# Patient Record
Sex: MALE | Race: BLACK OR AFRICAN AMERICAN | NOT HISPANIC OR LATINO | URBAN - METROPOLITAN AREA
[De-identification: names, ages, dates, MRNs, and addresses within clinical notes are randomized per-mention and may not be internally consistent; named-entity substitution may affect disease eponyms.]

---

## 2023-08-11 ENCOUNTER — HOSPITAL ENCOUNTER (OUTPATIENT)
Facility: HOSPITAL | Age: 68
Discharge: HOSPICE | End: 2023-08-14
Attending: STUDENT IN AN ORGANIZED HEALTH CARE EDUCATION/TRAINING PROGRAM | Admitting: EMERGENCY MEDICINE
Payer: MEDICARE

## 2023-08-11 DIAGNOSIS — N39.0 URINARY TRACT INFECTION WITHOUT HEMATURIA, SITE UNSPECIFIED: Primary | ICD-10-CM

## 2023-08-11 DIAGNOSIS — R07.9 CHEST PAIN: ICD-10-CM

## 2023-08-11 DIAGNOSIS — R41.82 AMS (ALTERED MENTAL STATUS): ICD-10-CM

## 2023-08-11 LAB
ALBUMIN SERPL BCP-MCNC: 2.3 G/DL (ref 3.5–5.2)
ALP SERPL-CCNC: 170 U/L (ref 55–135)
ALT SERPL W/O P-5'-P-CCNC: 10 U/L (ref 10–44)
ANION GAP SERPL CALC-SCNC: 10 MMOL/L (ref 8–16)
AST SERPL-CCNC: 58 U/L (ref 10–40)
BASOPHILS # BLD AUTO: 0.05 K/UL (ref 0–0.2)
BASOPHILS NFR BLD: 0.5 % (ref 0–1.9)
BILIRUB SERPL-MCNC: 0.9 MG/DL (ref 0.1–1)
BUN SERPL-MCNC: 10 MG/DL (ref 8–23)
CALCIUM SERPL-MCNC: 9.8 MG/DL (ref 8.7–10.5)
CHLORIDE SERPL-SCNC: 104 MMOL/L (ref 95–110)
CO2 SERPL-SCNC: 30 MMOL/L (ref 23–29)
CREAT SERPL-MCNC: 0.6 MG/DL (ref 0.5–1.4)
DIFFERENTIAL METHOD: ABNORMAL
EOSINOPHIL # BLD AUTO: 0.1 K/UL (ref 0–0.5)
EOSINOPHIL NFR BLD: 0.7 % (ref 0–8)
ERYTHROCYTE [DISTWIDTH] IN BLOOD BY AUTOMATED COUNT: 18 % (ref 11.5–14.5)
EST. GFR  (NO RACE VARIABLE): >60 ML/MIN/1.73 M^2
GLUCOSE SERPL-MCNC: 98 MG/DL (ref 70–110)
HCT VFR BLD AUTO: 31.8 % (ref 40–54)
HGB BLD-MCNC: 9.7 G/DL (ref 14–18)
IMM GRANULOCYTES # BLD AUTO: 0.03 K/UL (ref 0–0.04)
IMM GRANULOCYTES NFR BLD AUTO: 0.3 % (ref 0–0.5)
LYMPHOCYTES # BLD AUTO: 0.9 K/UL (ref 1–4.8)
LYMPHOCYTES NFR BLD: 8.9 % (ref 18–48)
MCH RBC QN AUTO: 26.6 PG (ref 27–31)
MCHC RBC AUTO-ENTMCNC: 30.5 G/DL (ref 32–36)
MCV RBC AUTO: 87 FL (ref 82–98)
MONOCYTES # BLD AUTO: 0.5 K/UL (ref 0.3–1)
MONOCYTES NFR BLD: 5.1 % (ref 4–15)
NEUTROPHILS # BLD AUTO: 8.5 K/UL (ref 1.8–7.7)
NEUTROPHILS NFR BLD: 84.5 % (ref 38–73)
NRBC BLD-RTO: 0 /100 WBC
PLATELET # BLD AUTO: 236 K/UL (ref 150–450)
PMV BLD AUTO: 10.7 FL (ref 9.2–12.9)
POTASSIUM SERPL-SCNC: 2.9 MMOL/L (ref 3.5–5.1)
PROT SERPL-MCNC: 7.8 G/DL (ref 6–8.4)
RBC # BLD AUTO: 3.65 M/UL (ref 4.6–6.2)
SARS-COV-2 RDRP RESP QL NAA+PROBE: NEGATIVE
SODIUM SERPL-SCNC: 144 MMOL/L (ref 136–145)
WBC # BLD AUTO: 10.04 K/UL (ref 3.9–12.7)

## 2023-08-11 PROCEDURE — 25000003 PHARM REV CODE 250: Performed by: STUDENT IN AN ORGANIZED HEALTH CARE EDUCATION/TRAINING PROGRAM

## 2023-08-11 PROCEDURE — 85025 COMPLETE CBC W/AUTO DIFF WBC: CPT

## 2023-08-11 PROCEDURE — 99285 EMERGENCY DEPT VISIT HI MDM: CPT | Mod: 25

## 2023-08-11 PROCEDURE — 96361 HYDRATE IV INFUSION ADD-ON: CPT

## 2023-08-11 PROCEDURE — 80053 COMPREHEN METABOLIC PANEL: CPT

## 2023-08-11 PROCEDURE — 25000003 PHARM REV CODE 250

## 2023-08-11 PROCEDURE — U0002 COVID-19 LAB TEST NON-CDC: HCPCS

## 2023-08-11 RX ADMIN — SODIUM CHLORIDE 500 ML: 9 INJECTION, SOLUTION INTRAVENOUS at 10:08

## 2023-08-11 NOTE — Clinical Note
Diagnosis: Urinary tract infection without hematuria, site unspecified [[id number]]   Future Attending Provider: MARCIE BARRAZA [[id number]]   Admitting Provider:: LOUIE GARCIA [[id number]]

## 2023-08-12 PROBLEM — E78.5 HLD (HYPERLIPIDEMIA): Status: ACTIVE | Noted: 2023-08-12

## 2023-08-12 PROBLEM — I10 HTN (HYPERTENSION): Status: ACTIVE | Noted: 2023-08-12

## 2023-08-12 PROBLEM — E87.6 HYPOKALEMIA: Status: ACTIVE | Noted: 2023-08-12

## 2023-08-12 PROBLEM — F03.918 DEMENTIA WITH BEHAVIORAL DISTURBANCE: Status: ACTIVE | Noted: 2023-08-12

## 2023-08-12 PROBLEM — G93.40 ACUTE ENCEPHALOPATHY: Status: RESOLVED | Noted: 2023-08-12 | Resolved: 2023-08-12

## 2023-08-12 PROBLEM — N39.0 URINARY TRACT INFECTION WITHOUT HEMATURIA: Status: ACTIVE | Noted: 2023-08-12

## 2023-08-12 PROBLEM — G93.40 ACUTE ENCEPHALOPATHY: Status: ACTIVE | Noted: 2023-08-12

## 2023-08-12 LAB
ALBUMIN SERPL BCP-MCNC: 2.2 G/DL (ref 3.5–5.2)
ALP SERPL-CCNC: 175 U/L (ref 55–135)
ALT SERPL W/O P-5'-P-CCNC: 8 U/L (ref 10–44)
AMMONIA PLAS-SCNC: 44 UMOL/L (ref 10–50)
AMMONIA PLAS-SCNC: 61 UMOL/L (ref 10–50)
ANION GAP SERPL CALC-SCNC: 14 MMOL/L (ref 8–16)
AST SERPL-CCNC: 55 U/L (ref 10–40)
BACTERIA #/AREA URNS AUTO: ABNORMAL /HPF
BASOPHILS # BLD AUTO: 0.06 K/UL (ref 0–0.2)
BASOPHILS NFR BLD: 0.5 % (ref 0–1.9)
BILIRUB SERPL-MCNC: 0.8 MG/DL (ref 0.1–1)
BILIRUB UR QL STRIP: NEGATIVE
BUN SERPL-MCNC: 9 MG/DL (ref 8–23)
CALCIUM SERPL-MCNC: 9.3 MG/DL (ref 8.7–10.5)
CHLORIDE SERPL-SCNC: 104 MMOL/L (ref 95–110)
CLARITY UR REFRACT.AUTO: ABNORMAL
CO2 SERPL-SCNC: 27 MMOL/L (ref 23–29)
COLOR UR AUTO: ABNORMAL
CREAT SERPL-MCNC: 0.6 MG/DL (ref 0.5–1.4)
DIFFERENTIAL METHOD: ABNORMAL
EOSINOPHIL # BLD AUTO: 0.1 K/UL (ref 0–0.5)
EOSINOPHIL NFR BLD: 0.5 % (ref 0–8)
ERYTHROCYTE [DISTWIDTH] IN BLOOD BY AUTOMATED COUNT: 18.5 % (ref 11.5–14.5)
EST. GFR  (NO RACE VARIABLE): >60 ML/MIN/1.73 M^2
GLUCOSE SERPL-MCNC: 100 MG/DL (ref 70–110)
GLUCOSE UR QL STRIP: NEGATIVE
HCT VFR BLD AUTO: 29.9 % (ref 40–54)
HGB BLD-MCNC: 8.9 G/DL (ref 14–18)
HGB UR QL STRIP: ABNORMAL
HYALINE CASTS UR QL AUTO: 4 /LPF
IMM GRANULOCYTES # BLD AUTO: 0.03 K/UL (ref 0–0.04)
IMM GRANULOCYTES NFR BLD AUTO: 0.3 % (ref 0–0.5)
INR PPP: 1.3 (ref 0.8–1.2)
KETONES UR QL STRIP: ABNORMAL
LEUKOCYTE ESTERASE UR QL STRIP: ABNORMAL
LYMPHOCYTES # BLD AUTO: 0.8 K/UL (ref 1–4.8)
LYMPHOCYTES NFR BLD: 7 % (ref 18–48)
MAGNESIUM SERPL-MCNC: 1.5 MG/DL (ref 1.6–2.6)
MCH RBC QN AUTO: 26.3 PG (ref 27–31)
MCHC RBC AUTO-ENTMCNC: 29.8 G/DL (ref 32–36)
MCV RBC AUTO: 88 FL (ref 82–98)
MICROSCOPIC COMMENT: ABNORMAL
MONOCYTES # BLD AUTO: 0.6 K/UL (ref 0.3–1)
MONOCYTES NFR BLD: 5.1 % (ref 4–15)
NEUTROPHILS # BLD AUTO: 9.7 K/UL (ref 1.8–7.7)
NEUTROPHILS NFR BLD: 86.6 % (ref 38–73)
NITRITE UR QL STRIP: NEGATIVE
NRBC BLD-RTO: 0 /100 WBC
PH UR STRIP: 6 [PH] (ref 5–8)
PHOSPHATE SERPL-MCNC: 2.3 MG/DL (ref 2.7–4.5)
PLATELET # BLD AUTO: 202 K/UL (ref 150–450)
PMV BLD AUTO: 11 FL (ref 9.2–12.9)
POTASSIUM SERPL-SCNC: 3.1 MMOL/L (ref 3.5–5.1)
PROT SERPL-MCNC: 7.5 G/DL (ref 6–8.4)
PROT UR QL STRIP: ABNORMAL
PROTHROMBIN TIME: 13.3 SEC (ref 9–12.5)
RBC # BLD AUTO: 3.39 M/UL (ref 4.6–6.2)
RBC #/AREA URNS AUTO: >100 /HPF (ref 0–4)
SODIUM SERPL-SCNC: 145 MMOL/L (ref 136–145)
SP GR UR STRIP: 1.02 (ref 1–1.03)
SQUAMOUS #/AREA URNS AUTO: 5 /HPF
URN SPEC COLLECT METH UR: ABNORMAL
WBC # BLD AUTO: 11.15 K/UL (ref 3.9–12.7)
WBC #/AREA URNS AUTO: >100 /HPF (ref 0–5)

## 2023-08-12 PROCEDURE — G0378 HOSPITAL OBSERVATION PER HR: HCPCS

## 2023-08-12 PROCEDURE — 25000003 PHARM REV CODE 250: Performed by: EMERGENCY MEDICINE

## 2023-08-12 PROCEDURE — 83735 ASSAY OF MAGNESIUM: CPT

## 2023-08-12 PROCEDURE — 87088 URINE BACTERIA CULTURE: CPT

## 2023-08-12 PROCEDURE — 87086 URINE CULTURE/COLONY COUNT: CPT

## 2023-08-12 PROCEDURE — 96365 THER/PROPH/DIAG IV INF INIT: CPT

## 2023-08-12 PROCEDURE — 96376 TX/PRO/DX INJ SAME DRUG ADON: CPT

## 2023-08-12 PROCEDURE — 25000003 PHARM REV CODE 250

## 2023-08-12 PROCEDURE — 87106 FUNGI IDENTIFICATION YEAST: CPT

## 2023-08-12 PROCEDURE — 99223 1ST HOSP IP/OBS HIGH 75: CPT | Mod: ,,, | Performed by: INTERNAL MEDICINE

## 2023-08-12 PROCEDURE — 63600175 PHARM REV CODE 636 W HCPCS

## 2023-08-12 PROCEDURE — 85025 COMPLETE CBC W/AUTO DIFF WBC: CPT

## 2023-08-12 PROCEDURE — 80053 COMPREHEN METABOLIC PANEL: CPT

## 2023-08-12 PROCEDURE — 96375 TX/PRO/DX INJ NEW DRUG ADDON: CPT

## 2023-08-12 PROCEDURE — 63600175 PHARM REV CODE 636 W HCPCS: Performed by: EMERGENCY MEDICINE

## 2023-08-12 PROCEDURE — 82140 ASSAY OF AMMONIA: CPT | Mod: 91

## 2023-08-12 PROCEDURE — 81001 URINALYSIS AUTO W/SCOPE: CPT

## 2023-08-12 PROCEDURE — 36415 COLL VENOUS BLD VENIPUNCTURE: CPT

## 2023-08-12 PROCEDURE — 85610 PROTHROMBIN TIME: CPT

## 2023-08-12 PROCEDURE — 84100 ASSAY OF PHOSPHORUS: CPT

## 2023-08-12 PROCEDURE — 99223 PR INITIAL HOSPITAL CARE,LEVL III: ICD-10-PCS | Mod: ,,, | Performed by: INTERNAL MEDICINE

## 2023-08-12 RX ORDER — TAMSULOSIN HYDROCHLORIDE 0.4 MG/1
0.4 CAPSULE ORAL DAILY
Status: DISCONTINUED | OUTPATIENT
Start: 2023-08-12 | End: 2023-08-14 | Stop reason: HOSPADM

## 2023-08-12 RX ORDER — ACETAMINOPHEN 325 MG/1
650 TABLET ORAL EVERY 4 HOURS PRN
Status: DISCONTINUED | OUTPATIENT
Start: 2023-08-12 | End: 2023-08-14 | Stop reason: HOSPADM

## 2023-08-12 RX ORDER — SODIUM CHLORIDE 0.9 % (FLUSH) 0.9 %
10 SYRINGE (ML) INJECTION EVERY 12 HOURS PRN
Status: DISCONTINUED | OUTPATIENT
Start: 2023-08-12 | End: 2023-08-14 | Stop reason: HOSPADM

## 2023-08-12 RX ORDER — NALOXONE HCL 0.4 MG/ML
0.02 VIAL (ML) INJECTION
Status: DISCONTINUED | OUTPATIENT
Start: 2023-08-12 | End: 2023-08-14 | Stop reason: HOSPADM

## 2023-08-12 RX ORDER — HALOPERIDOL 5 MG/ML
2 INJECTION INTRAMUSCULAR ONCE
Status: COMPLETED | OUTPATIENT
Start: 2023-08-12 | End: 2023-08-12

## 2023-08-12 RX ORDER — NAPROXEN SODIUM 220 MG/1
81 TABLET, FILM COATED ORAL DAILY
Status: DISCONTINUED | OUTPATIENT
Start: 2023-08-12 | End: 2023-08-14 | Stop reason: HOSPADM

## 2023-08-12 RX ORDER — HALOPERIDOL 5 MG/ML
2.5 INJECTION INTRAMUSCULAR ONCE AS NEEDED
Status: DISCONTINUED | OUTPATIENT
Start: 2023-08-12 | End: 2023-08-14 | Stop reason: HOSPADM

## 2023-08-12 RX ORDER — MAGNESIUM SULFATE HEPTAHYDRATE 40 MG/ML
2 INJECTION, SOLUTION INTRAVENOUS ONCE
Status: DISCONTINUED | OUTPATIENT
Start: 2023-08-12 | End: 2023-08-13

## 2023-08-12 RX ORDER — GLUCAGON 1 MG
1 KIT INJECTION
Status: DISCONTINUED | OUTPATIENT
Start: 2023-08-12 | End: 2023-08-14 | Stop reason: HOSPADM

## 2023-08-12 RX ORDER — PANTOPRAZOLE SODIUM 40 MG/1
40 TABLET, DELAYED RELEASE ORAL DAILY
Status: DISCONTINUED | OUTPATIENT
Start: 2023-08-12 | End: 2023-08-14 | Stop reason: HOSPADM

## 2023-08-12 RX ORDER — POTASSIUM CHLORIDE 20 MEQ/1
40 TABLET, EXTENDED RELEASE ORAL ONCE
Status: COMPLETED | OUTPATIENT
Start: 2023-08-12 | End: 2023-08-12

## 2023-08-12 RX ORDER — ATORVASTATIN CALCIUM 40 MG/1
40 TABLET, FILM COATED ORAL DAILY
Status: DISCONTINUED | OUTPATIENT
Start: 2023-08-12 | End: 2023-08-14 | Stop reason: HOSPADM

## 2023-08-12 RX ORDER — CARVEDILOL 25 MG/1
25 TABLET ORAL 2 TIMES DAILY
Status: DISCONTINUED | OUTPATIENT
Start: 2023-08-12 | End: 2023-08-14 | Stop reason: HOSPADM

## 2023-08-12 RX ORDER — IBUPROFEN 200 MG
24 TABLET ORAL
Status: DISCONTINUED | OUTPATIENT
Start: 2023-08-12 | End: 2023-08-14 | Stop reason: HOSPADM

## 2023-08-12 RX ORDER — ENOXAPARIN SODIUM 100 MG/ML
30 INJECTION SUBCUTANEOUS EVERY 24 HOURS
Status: DISCONTINUED | OUTPATIENT
Start: 2023-08-12 | End: 2023-08-12

## 2023-08-12 RX ORDER — AMLODIPINE BESYLATE 10 MG/1
10 TABLET ORAL DAILY
Status: DISCONTINUED | OUTPATIENT
Start: 2023-08-12 | End: 2023-08-14 | Stop reason: HOSPADM

## 2023-08-12 RX ORDER — ENOXAPARIN SODIUM 100 MG/ML
40 INJECTION SUBCUTANEOUS EVERY 24 HOURS
Status: DISCONTINUED | OUTPATIENT
Start: 2023-08-12 | End: 2023-08-14 | Stop reason: HOSPADM

## 2023-08-12 RX ORDER — IBUPROFEN 200 MG
16 TABLET ORAL
Status: DISCONTINUED | OUTPATIENT
Start: 2023-08-12 | End: 2023-08-14 | Stop reason: HOSPADM

## 2023-08-12 RX ORDER — LACTULOSE 10 G/15ML
20 SOLUTION ORAL 3 TIMES DAILY
Status: DISCONTINUED | OUTPATIENT
Start: 2023-08-12 | End: 2023-08-14 | Stop reason: HOSPADM

## 2023-08-12 RX ORDER — HALOPERIDOL 5 MG/ML
2 INJECTION INTRAMUSCULAR
Status: COMPLETED | OUTPATIENT
Start: 2023-08-12 | End: 2023-08-12

## 2023-08-12 RX ORDER — LISINOPRIL 20 MG/1
40 TABLET ORAL DAILY
Status: DISCONTINUED | OUTPATIENT
Start: 2023-08-12 | End: 2023-08-14 | Stop reason: HOSPADM

## 2023-08-12 RX ADMIN — CARVEDILOL 25 MG: 25 TABLET, FILM COATED ORAL at 08:08

## 2023-08-12 RX ADMIN — TAMSULOSIN HYDROCHLORIDE 0.4 MG: 0.4 CAPSULE ORAL at 08:08

## 2023-08-12 RX ADMIN — PANTOPRAZOLE SODIUM 40 MG: 40 TABLET, DELAYED RELEASE ORAL at 08:08

## 2023-08-12 RX ADMIN — AMLODIPINE BESYLATE 10 MG: 10 TABLET ORAL at 08:08

## 2023-08-12 RX ADMIN — ASPIRIN 81 MG 81 MG: 81 TABLET ORAL at 08:08

## 2023-08-12 RX ADMIN — HALOPERIDOL LACTATE 2 MG: 5 INJECTION, SOLUTION INTRAMUSCULAR at 03:08

## 2023-08-12 RX ADMIN — HALOPERIDOL LACTATE 2 MG: 5 INJECTION, SOLUTION INTRAMUSCULAR at 06:08

## 2023-08-12 RX ADMIN — ATORVASTATIN CALCIUM 40 MG: 40 TABLET, FILM COATED ORAL at 08:08

## 2023-08-12 RX ADMIN — POTASSIUM CHLORIDE 40 MEQ: 1500 TABLET, EXTENDED RELEASE ORAL at 08:08

## 2023-08-12 RX ADMIN — CEFTRIAXONE 1 G: 1 INJECTION, POWDER, FOR SOLUTION INTRAMUSCULAR; INTRAVENOUS at 03:08

## 2023-08-12 RX ADMIN — LISINOPRIL 40 MG: 20 TABLET ORAL at 08:08

## 2023-08-12 NOTE — ED NOTES
Pt care assumed. Report received by MASTER Kathleen. Pt lying in stretcher in low and locked position and side rails raised x2. Call light, pt's belongings, and bedside table within pt's reach. Pt on continuous cardiac monitoring, pulse oximetry, and BP cycling every 30 minutes.

## 2023-08-12 NOTE — HPI
68-year-old male with of dementia, reported stage IV liver cancer, HLD, HTN  is presenting from [facility] after having aggressive behavior. Pt has laceration of the left foot, there is dried blood there but no injury or laceration.Patient condition has been declining from the past year after cancer diagnosis. Patient was found to be altered at his nursing home, threw a chair, and broke a window. Patients niece says this is abnormal for the patient and he normally is bed bound, slurs his words but does know his name and his  at baseline. He has an indwelling catheter that was pulled out when he was agitated, but was replaced. Patient denies fever, chills, nausea, vomiting, SOB, CP, dysuria, abdominal pain, leg swelling.     History is limited secondary to the patient's clinical condition. States he does not want to be here and want to go home with niece. Said he does not want to go back to [facility].       Afebrile, -130s, WBC 10, K 2.9, Cr 0.6, AlkP 170,   U/A >100 WBCs and RBCs, +3 leukocytes, culture pending, CTH clear  CXR - Suboptimal inspiration with mild bilateral atelectasis  Given CTX, KHCO3, 500 mL NS, haldol in the ED

## 2023-08-12 NOTE — NURSING
[facility] dept called to inform about [facility] orders. Spoke w/ Malinda and she said she will try to assign pt to a watcher.

## 2023-08-12 NOTE — PROGRESS NOTES
Pharmacist Renal Dose Adjustment Note    Ozzie Nobles is a 68 y.o. male being treated with the medication enoxaparin    Patient Data:    Vital Signs (Most Recent):  Temp: 97.7 °F (36.5 °C) (08/12/23 0755)  Pulse: 96 (08/12/23 0755)  Resp: 18 (08/12/23 0755)  BP: (!) 142/87 (08/12/23 0755)  SpO2: 96 % (08/12/23 0755) Vital Signs (72h Range):  Temp:  [97 °F (36.1 °C)-98.9 °F (37.2 °C)]   Pulse:  []   Resp:  [16-25]   BP: (106-154)/(72-88)   SpO2:  [92 %-96 %]      Recent Labs   Lab 08/11/23 2158 08/12/23  0343   CREATININE 0.6 0.6     Serum creatinine: 0.6 mg/dL 08/12/23 0343  Estimated creatinine clearance: 95.9 mL/min    Medication:Enoxaparin dose: 30 mg frequency daily will be changed to medication:enoxaparin dose:40 mg frequency:daily    Pharmacist's Name: Radha Gonzales  Pharmacist's Extension: [phone]

## 2023-08-12 NOTE — H&P
[facility] - Intensive Care ([address] Medicine  History & Physical    Patient Name: Ozzie Nobles  MRN: [id number]  Patient Class: OP- Observation  Admission Date: 2023  Attending Physician: Denver Mccoy, *   Primary Care Provider: No primary care provider on file.         Patient information was obtained from patient, past medical records and ER records.     Subjective:     Chief Complaint:   Chief Complaint   Patient presents with    Aggressive Behavior     Patient with history of behavior. Patient from [facility], was having aggressive behavior with staff earlier but they were able to calm him down. Staff thinks that the patient then threw chair at window. Has laceration to his left foot, bleeding controlled.        HPI: 68-year-old male with of dementia, reported stage IV liver cancer, HLD, HTN  is presenting from [facility] after having aggressive behavior. Pt has laceration of the left foot, there is dried blood there but no injury or laceration.Patient condition has been declining from the past year after cancer diagnosis. Patient was found to be altered at his nursing home, threw a chair, and broke a window. Patients niece says this is abnormal for the patient and he normally is bed bound, slurs his words but does know his name and his  at baseline. He has an indwelling catheter that was pulled out when he was agitated, but was replaced. Patient denies fever, chills, nausea, vomiting, SOB, CP, dysuria, abdominal pain, leg swelling.     History is limited secondary to the patient's clinical condition. States he does not want to be here and want to go home with niece. Said he does not want to go back to [facility].       Afebrile, -130s, WBC 10, K 2.9, Cr 0.6, AlkP 170,   U/A >100 WBCs and RBCs, +3 leukocytes, culture pending, CTH clear  CXR - Suboptimal inspiration with mild bilateral atelectasis  Given CTX, KHCO3, 500 mL NS, haldol in the  ED             No past medical history on file.    No past surgical history on file.    Review of patient's allergies indicates:  Not on File    No current facility-administered medications on file prior to encounter.     No current outpatient medications on file prior to encounter.     Family History    None       Tobacco Use    Smoking status: Not on file    Smokeless tobacco: Not on file   Substance and Sexual Activity    Alcohol use: Not on file    Drug use: Not on file    Sexual activity: Not on file     Review of Systems   Constitutional:  Negative for chills, fatigue and fever.   HENT: Negative.     Eyes:  Negative for pain and redness.   Respiratory:  Negative for apnea, choking, shortness of breath and stridor.    Cardiovascular:  Negative for chest pain and leg swelling.   Gastrointestinal:  Negative for abdominal distention, abdominal pain and blood in stool.   Endocrine: Negative.    Genitourinary:  Negative for dysuria, flank pain and genital sores.   Neurological: Negative.    Psychiatric/Behavioral:  Positive for agitation and behavioral problems.      Objective:     Vital Signs (Most Recent):  Temp: 98.8 °F (37.1 °C) (08/11/23 2034)  Pulse: 110 (08/12/23 0000)  Resp: (!) 25 (08/12/23 0000)  BP: 129/88 (08/12/23 0000)  SpO2: 95 % (08/12/23 0000) Vital Signs (24h Range):  Temp:  [98.4 °F (36.9 °C)-98.8 °F (37.1 °C)] 98.8 °F (37.1 °C)  Pulse:  [100-110] 110  Resp:  [16-25] 25  SpO2:  [92 %-95 %] 95 %  BP: (106-136)/(72-88) 129/88        There is no height or weight on file to calculate BMI.     Physical Exam  Constitutional:       General: He is not in acute distress.     Appearance: Normal appearance. He is not toxic-appearing.   HENT:      Head: Normocephalic.      Mouth/Throat:      Mouth: Mucous membranes are moist.      Pharynx: Oropharynx is clear.   Eyes:      Extraocular Movements: Extraocular movements intact.   Cardiovascular:      Rate and Rhythm: Normal rate and regular rhythm.      Pulses:  "Normal pulses.      Heart sounds: Normal heart sounds. No murmur heard.  Pulmonary:      Effort: No respiratory distress.      Breath sounds: Normal breath sounds. No wheezing.   Abdominal:      General: There is distension.      Tenderness: There is no abdominal tenderness.   Musculoskeletal:         General: Normal range of motion.   Skin:     General: Skin is warm and dry.      Capillary Refill: Capillary refill takes less than 2 seconds.   Neurological:      Mental Status: He is alert. He is disoriented.      Comments: Alert to name and year                Significant Labs: All pertinent labs within the past 24 hours have been reviewed.  CBC:   Recent Labs   Lab 08/11/23 2158   WBC 10.04   HGB 9.7*   HCT 31.8*        CMP:   Recent Labs   Lab 08/11/23 2158      K 2.9*      CO2 30*   GLU 98   BUN 10   CREATININE 0.6   CALCIUM 9.8   PROT 7.8   ALBUMIN 2.3*   BILITOT 0.9   ALKPHOS 170*   AST 58*   ALT 10   ANIONGAP 10     Urine Culture: No results for input(s): "LABURIN" in the last 48 hours.  Urine Studies:   Recent Labs   Lab 08/12/23  0022   COLORU Red*   APPEARANCEUA Cloudy*   PHUR 6.0   SPECGRAV 1.020   PROTEINUA 2+*   GLUCUA Negative   KETONESU 1+*   BILIRUBINUA Negative   OCCULTUA 3+*   NITRITE Negative   LEUKOCYTESUR 3+*   RBCUA >100*   WBCUA >100*   BACTERIA Many*   SQUAMEPITHEL 5   HYALINECASTS 4*       Significant Imaging: I have reviewed all pertinent imaging results/findings within the past 24 hours.  I have reviewed and interpreted all pertinent imaging results/findings within the past 24 hours.  Imaging Results              X-Ray Chest AP Portable (In process)  Result time 08/12/23 03:17:27                     CT Head Without Contrast (Final result)  Result time 08/12/23 01:16:12      Final result by Memo Torres MD (08/12/23 01:16:12)                   Impression:      No acute intracranial abnormality.    Electronically signed by resident: Tommy " Alsaggaf  Date:    08/12/2023  Time:    00:49    Electronically signed by: Memo Torres MD  Date:    08/12/2023  Time:    01:16               Narrative:    EXAMINATION:  CT HEAD WITHOUT CONTRAST    CLINICAL HISTORY:  Head trauma, minor (Age >= 65y);    TECHNIQUE:  Low dose axial CT images obtained throughout the head without intravenous contrast. Sagittal and coronal reconstructions were performed.    COMPARISON:  None.    FINDINGS:  Generalized cerebral volume loss.  No hydrocephalus.  No extra-axial blood or fluid collections.    Supratentorial periventricular white matter hypoattenuation suggestive of chronic microvascular ischemic change. No parenchymal mass, hemorrhage, edema or major vascular distribution infarct.    No depressed calvarial fracture. Mastoid air cells and paranasal sinuses are essentially clear.                                        X-Ray Chest AP Portable (Final result)  Result time 08/11/23 23:20:12      Final result by Josue Alfaro MD (08/11/23 23:20:12)                   Impression:      1. Suboptimal inspiration with mild bilateral atelectasis.  2. Pulmonary airspace disease on the left could be associated with atelectasis or patchy infiltrate.  Follow-up recommended.  3. Questionable skin fold versus mild pneumothorax laterally on the left.  Recommend repeat x-ray with inspiratory and expiratory views.  4.  This report was flagged in Epic as abnormal.      Electronically signed by: Josue Alfaro  Date:    08/11/2023  Time:    23:20               Narrative:    EXAMINATION:  XR CHEST AP PORTABLE    CLINICAL HISTORY:  Altered mental status, unspecified    TECHNIQUE:  Single frontal view of the chest was performed.    COMPARISON:  None    FINDINGS:  Large field of view.    Suboptimal inspiration.    Mild elevation the right hemidiaphragm.    Mild bilateral atelectasis.    Pulmonary airspace disease on the left could be associated with atelectasis or patchy infiltrate.    Questionable  skin fold versus mild pneumothorax laterally on the left.  Recommend repeat x-ray with inspiratory and expiratory views                                        Assessment/Plan:     Urinary tract infection without hematuria  68-year-old male with of dementia, reported stage IV liver cancer, HLD, HTN  is presenting from [facility] after having aggressive behavior. Pt has laceration of the left foot, there is dried blood there but no injury or laceration.Patient condition has been declining from the past year after cancer diagnosis. Patient was found to be altered at his nursing home, threw a chair, and broke a window. Patients niece says this is abnormal for the patient and he normally is bed bound, slurs his words but does know his name and his  at baseline. He has an indwelling catheter that was pulled out when he was agitated, but was replaced. Patient denies fever, chills, nausea, vomiting, SOB, CP, dysuria, abdominal pain, leg swelling.     -Afebrile, -130s, WBC 10, K 2.9, Cr 0.6, AlkP 170,   -U/A >100 WBCs and RBCs,+3 leukocytes, culture pending   -CTX, KHCO3, 500 mL NS, haldol in the ED  -continue abx and adjust based on sensitivities  -declining status of past yr with increasing behavorial issues  -worsening dementia and agitation 2/2 UTI    Dementia with behavioral disturbance  -lives at [facility]  -agitated, throwing objects, and breaking stuff  -given haldol in ED  -chronic dementia  -worsening dementia 2/2 UTI        HLD (hyperlipidemia)  -continue statin      HTN (hypertension)  -continue home lisinopril, HCTZ, Coreg, amlodipine  -stable      Hypokalemia  -K 2.9 in ED  -40 meq KHCO3 in ED  -continue to replete  -asymptomatic      VTE Risk Mitigation (From admission, onward)           Ordered     IP VTE HIGH RISK PATIENT  Once         23     Place sequential compression device  Until discontinued         23                           On 2023, patient  should be placed in hospital observation services under my care in collaboration with .    Robe Rosario MD  Department of Hospital Medicine  [facility] - Intensive Care ([facility]

## 2023-08-12 NOTE — NURSING
Pt appears to be very anxious and uncooperative. Pt is trying to get out of the bed and insisting on going home. Gege, DO informed via Sec[employer] Chat.    Pt pulled out his cardiac telemetry wires and refuses to put it back on.

## 2023-08-12 NOTE — NURSING
Pt received via stretcher fr ED. Pt DO to time, place and situation. Pt cooperative during diaper change and vital signs monitoring. Pt w/ 22g PIV on right wrist. Pt w/ FC to urine bag. Noted penile serosanguineous drainage. Pt became uncooperative upon further assessment. Pt insisting on going home. MD informed.

## 2023-08-12 NOTE — NURSING
Nurses Note -- 4 Eyes      8/12/2023   6:10 AM      Skin assessed during: Admit      [] No Altered Skin Integrity Present    []Prevention Measures Documented      [x] Yes- Altered Skin Integrity Present or Discovered   [x] LDA Added if Not in Epic (Describe Wound)   [x] New Altered Skin Integrity was Present on Admit and Documented in LDA   [] Wound Image Taken    Wound Care Consulted? Yes    Attending Nurse:  Claudette Mayen RN    Second RN/Staff Member: Xochilt Spann RN

## 2023-08-12 NOTE — SUBJECTIVE & OBJECTIVE
No past medical history on file.    No past surgical history on file.    Review of patient's allergies indicates:  Not on File    No current facility-administered medications on file prior to encounter.     No current outpatient medications on file prior to encounter.     Family History    None       Tobacco Use    Smoking status: Not on file    Smokeless tobacco: Not on file   Substance and Sexual Activity    Alcohol use: Not on file    Drug use: Not on file    Sexual activity: Not on file     Review of Systems   Constitutional:  Negative for chills, fatigue and fever.   HENT: Negative.     Eyes:  Negative for pain and redness.   Respiratory:  Negative for apnea, choking, shortness of breath and stridor.    Cardiovascular:  Negative for chest pain and leg swelling.   Gastrointestinal:  Negative for abdominal distention, abdominal pain and blood in stool.   Endocrine: Negative.    Genitourinary:  Negative for dysuria, flank pain and genital sores.   Neurological: Negative.    Psychiatric/Behavioral:  Positive for agitation and behavioral problems.      Objective:     Vital Signs (Most Recent):  Temp: 98.8 °F (37.1 °C) (08/11/23 2034)  Pulse: 110 (08/12/23 0000)  Resp: (!) 25 (08/12/23 0000)  BP: 129/88 (08/12/23 0000)  SpO2: 95 % (08/12/23 0000) Vital Signs (24h Range):  Temp:  [98.4 °F (36.9 °C)-98.8 °F (37.1 °C)] 98.8 °F (37.1 °C)  Pulse:  [100-110] 110  Resp:  [16-25] 25  SpO2:  [92 %-95 %] 95 %  BP: (106-136)/(72-88) 129/88        There is no height or weight on file to calculate BMI.     Physical Exam  Constitutional:       General: He is not in acute distress.     Appearance: Normal appearance. He is not toxic-appearing.   HENT:      Head: Normocephalic.      Mouth/Throat:      Mouth: Mucous membranes are moist.      Pharynx: Oropharynx is clear.   Eyes:      Extraocular Movements: Extraocular movements intact.   Cardiovascular:      Rate and Rhythm: Normal rate and regular rhythm.      Pulses: Normal pulses.  "     Heart sounds: Normal heart sounds. No murmur heard.  Pulmonary:      Effort: No respiratory distress.      Breath sounds: Normal breath sounds. No wheezing.   Abdominal:      General: There is distension.      Tenderness: There is no abdominal tenderness.   Musculoskeletal:         General: Normal range of motion.   Skin:     General: Skin is warm and dry.      Capillary Refill: Capillary refill takes less than 2 seconds.   Neurological:      Mental Status: He is alert. He is disoriented.      Comments: Alert to name and year                Significant Labs: All pertinent labs within the past 24 hours have been reviewed.  CBC:   Recent Labs   Lab 08/11/23 2158   WBC 10.04   HGB 9.7*   HCT 31.8*        CMP:   Recent Labs   Lab 08/11/23 2158      K 2.9*      CO2 30*   GLU 98   BUN 10   CREATININE 0.6   CALCIUM 9.8   PROT 7.8   ALBUMIN 2.3*   BILITOT 0.9   ALKPHOS 170*   AST 58*   ALT 10   ANIONGAP 10     Urine Culture: No results for input(s): "LABURIN" in the last 48 hours.  Urine Studies:   Recent Labs   Lab 08/12/23  0022   COLORU Red*   APPEARANCEUA Cloudy*   PHUR 6.0   SPECGRAV 1.020   PROTEINUA 2+*   GLUCUA Negative   KETONESU 1+*   BILIRUBINUA Negative   OCCULTUA 3+*   NITRITE Negative   LEUKOCYTESUR 3+*   RBCUA >100*   WBCUA >100*   BACTERIA Many*   SQUAMEPITHEL 5   HYALINECASTS 4*       Significant Imaging: I have reviewed all pertinent imaging results/findings within the past 24 hours.  I have reviewed and interpreted all pertinent imaging results/findings within the past 24 hours.  Imaging Results              X-Ray Chest AP Portable (In process)  Result time 08/12/23 03:17:27                     CT Head Without Contrast (Final result)  Result time 08/12/23 01:16:12      Final result by Memo Torres MD (08/12/23 01:16:12)                   Impression:      No acute intracranial abnormality.    Electronically signed by resident: Tommy " Alsaggaf  Date:    08/12/2023  Time:    00:49    Electronically signed by: Memo Torres MD  Date:    08/12/2023  Time:    01:16               Narrative:    EXAMINATION:  CT HEAD WITHOUT CONTRAST    CLINICAL HISTORY:  Head trauma, minor (Age >= 65y);    TECHNIQUE:  Low dose axial CT images obtained throughout the head without intravenous contrast. Sagittal and coronal reconstructions were performed.    COMPARISON:  None.    FINDINGS:  Generalized cerebral volume loss.  No hydrocephalus.  No extra-axial blood or fluid collections.    Supratentorial periventricular white matter hypoattenuation suggestive of chronic microvascular ischemic change. No parenchymal mass, hemorrhage, edema or major vascular distribution infarct.    No depressed calvarial fracture. Mastoid air cells and paranasal sinuses are essentially clear.                                        X-Ray Chest AP Portable (Final result)  Result time 08/11/23 23:20:12      Final result by Josue Alfaro MD (08/11/23 23:20:12)                   Impression:      1. Suboptimal inspiration with mild bilateral atelectasis.  2. Pulmonary airspace disease on the left could be associated with atelectasis or patchy infiltrate.  Follow-up recommended.  3. Questionable skin fold versus mild pneumothorax laterally on the left.  Recommend repeat x-ray with inspiratory and expiratory views.  4.  This report was flagged in Epic as abnormal.      Electronically signed by: Josue Alfaro  Date:    08/11/2023  Time:    23:20               Narrative:    EXAMINATION:  XR CHEST AP PORTABLE    CLINICAL HISTORY:  Altered mental status, unspecified    TECHNIQUE:  Single frontal view of the chest was performed.    COMPARISON:  None    FINDINGS:  Large field of view.    Suboptimal inspiration.    Mild elevation the right hemidiaphragm.    Mild bilateral atelectasis.    Pulmonary airspace disease on the left could be associated with atelectasis or patchy infiltrate.    Questionable  skin fold versus mild pneumothorax laterally on the left.  Recommend repeat x-ray with inspiratory and expiratory views

## 2023-08-12 NOTE — ASSESSMENT & PLAN NOTE
-lives at [facility]  -agitated, throwing objects, and breaking stuff  -given haldol in ED  -chronic dementia  -worsening dementia 2/2 UTI

## 2023-08-12 NOTE — ASSESSMENT & PLAN NOTE
68-year-old male with of dementia, reported stage IV liver cancer, HLD, HTN  is presenting from [facility] after having aggressive behavior. Pt has laceration of the left foot, there is dried blood there but no injury or laceration.Patient condition has been declining from the past year after cancer diagnosis. Patient was found to be altered at his nursing home, threw a chair, and broke a window. Patients niece says this is abnormal for the patient and he normally is bed bound, slurs his words but does know his name and his  at baseline. He has an indwelling catheter that was pulled out when he was agitated, but was replaced. Patient denies fever, chills, nausea, vomiting, SOB, CP, dysuria, abdominal pain, leg swelling.     -Afebrile, -130s, WBC 10, K 2.9, Cr 0.6, AlkP 170,   -U/A >100 WBCs and RBCs,+3 leukocytes, culture pending   -CTX, KHCO3, 500 mL NS, haldol in the ED  -continue abx and adjust based on sensitivities  -declining status of past yr with increasing behavorial issues  -worsening dementia and agitation 2/2 UTI

## 2023-08-12 NOTE — PROVIDER PROGRESS NOTES - EMERGENCY DEPT.
Encounter Date: 8/11/2023    ED Physician Progress Notes            ED Resident HAND-OFF NOTE:  Ozzie Nobles is a 68 y.o. male who presented to the ED on 8/12/2023, patient C/O aggressive behavior. I assumed care of patient from off-going ED physician team patient pending CT head, urinalysis.    Briefly, patient presenting for aggressive behavior, throwing chairs at facility.  Patient has reported history of metastatic liver cancer and is on hospice.  Patient's workup revealing hypokalemia.  Previous team exchange to his indwelling Doyle catheter.    On my evaluation, Ozzie Nobles appears well, hemodynamically stable and in NAD. Thus far, Ozzie Nobles has received:  Medications   sodium chloride 0.9% bolus 500 mL 500 mL (0 mLs Intravenous Stopped 8/11/23 2355)   potassium bicarbonate disintegrating tablet 40 mEq (40 mEq Oral Given 8/11/23 2355)       /88   Pulse 110   Temp 98.8 °F (37.1 °C) (Oral)   Resp (!) 25   SpO2 95%         Disposition: I anticipate patient will be admitted  ______________________  Jena Garcia MD   Emergency Medicine Resident      UPDATE:     CT head showing no evidence of metastatic disease.  Urinalysis showing UTI.  Patient already given Rocephin for an antibiotics.      Discussed with Hospital Medicine who agreed to admit patient to their service.    :  AMS (altered mental status)

## 2023-08-12 NOTE — ED PROVIDER NOTES
Encounter Date: 2023       History     Chief Complaint   Patient presents with    Aggressive Behavior     Patient with history of behavior. Patient from [facility], was having aggressive behavior with staff earlier but they were able to calm him down. Staff thinks that the patient then threw chair at window. Has laceration to his left foot, bleeding controlled.     68-year-old male with of dementia, reported stage IV liver cancer with extensive is presenting from [facility] after having aggressive behavior.  Triage note mentions concern for laceration of the left foot, there is dried blood there but no injury or laceration.  Patient was recently diagnosed with cancer at [facility] but the patient had been cognitively declining for the past year. Patient was found to be altered at his nursing home and threw a chair and broke a window. Patients serafin says this is abnormal for the patient and he normally is bed bound, slurs his words but does know his name and his  at baseline. Patients ivyece also mentions he has a catheter normally and that the bag was removed when he was agitated, but the catheter tubing is still in place. Patient denies any current pain.  History is limited secondary to the patient's clinical condition.    The history is provided by the patient. The history is limited by the condition of the patient. No  was used.     Review of patient's allergies indicates:  Not on File  No past medical history on file.  No past surgical history on file.  No family history on file.     Review of Systems   Constitutional:         See HPI       Physical Exam     Initial Vitals [23]   BP Pulse Resp Temp SpO2   106/72 101 16 98.4 °F (36.9 °C) (!) 92 %      MAP       --         Physical Exam    Nursing note and vitals reviewed.  Constitutional: He is not diaphoretic. No distress.   Chronically ill-appearing, cachectic in no acute distress   HENT:   Head:  Normocephalic and atraumatic.   Eyes: Conjunctivae and EOM are normal.   Neck: Neck supple.   Normal range of motion.  Cardiovascular:  Normal rate, regular rhythm, normal heart sounds and intact distal pulses.           Pulmonary/Chest: Breath sounds normal. No respiratory distress. He has no wheezes. He has no rhonchi. He has no rales.   Abdominal: Abdomen is soft. Bowel sounds are normal. He exhibits no distension. There is no abdominal tenderness. There is no rebound and no guarding.   Genitourinary:    Genitourinary Comments: Doyle catheter in place without bag attached.  Patient in diaper     Musculoskeletal:         General: No tenderness or edema. Normal range of motion.      Cervical back: Normal range of motion and neck supple.     Neurological: He is alert. He has normal strength.   Oriented to year and self.  Does not know where he is.  Not oriented to situation   Skin: Skin is warm and dry.   No lacerations or abrasions   Psychiatric: He has a normal mood and affect. Thought content normal.         ED Course   Procedures  Labs Reviewed   CBC W/ AUTO DIFFERENTIAL   COMPREHENSIVE METABOLIC PANEL   SARS-COV-2 RNA AMPLIFICATION, QUAL   URINALYSIS, REFLEX TO URINE CULTURE          Imaging Results    None          Medications - No data to display  Medical Decision Making:   Initial Assessment:   68-year-old male with of dementia, reported stage IV liver cancer with extensive is presenting from [facility] after having aggressive behavior.  He presents mildly hypoxic, otherwise vital signs within normal limits.  Patient is awake, answering questions but confused during interview.  Differential Diagnosis:   UTI, electrolyte derangement, dementia, sundowning, intracranial lesion, cerebral edema  Clinical Tests:   Lab Tests: Ordered  Radiological Study: Ordered            Attending Attestation:   Physician Attestation Statement for Resident:  As the supervising MD   Physician Attestation Statement: I  have personally seen and examined this patient.   I agree with the above history.  -:   As the supervising MD I agree with the above PE.     As the supervising MD I agree with the above treatment, course, plan, and disposition.   -: Workup notable for Urinary infection which may be contributing to patient's behavioral change. Discussed with hospital medicine who will see in ED for admission.                    ED Course as of 08/21/23 0721   Fri Aug 11, 2023   2252 SARS-CoV-2 RNA, Amplification, Qual: Negative [KL]   2252 WBC: 10.04  No leukocytosis [KL]   2253 Hemoglobin(!): 9.7  No baseline for comparison [KL]   2307 Potassium(!): 2.9 [KL]      ED Course User Index  [KL] Sheila Hanley MD                 Clinical Impression:   Final diagnoses:  [R41.82] AMS (altered mental status)               Isabela Pugh MD  08/21/23 8334

## 2023-08-12 NOTE — ED NOTES
Patient identifiers verified and correct for   LOC: The patient is awake, alert and but only mumbles. He does follow commands.  APPEARANCE: Patient appears comfortable and in no acute distress, patient is clean and well groomed.  SKIN: The skin is warm and dry, color consistent with ethnicity, patient has normal skin turgor and moist mucus membranes, skin intact, no breakdown or bruising noted.   MUSCULOSKELETAL: Patient moving all extremities spontaneously, no swelling noted.  RESPIRATORY: Airway is open and patent, respirations are spontaneous, patient has a normal effort and rate, no accessory muscle use noted, pt placed on continuous pulse ox with O2 sats noted at 97% on room air.  CARDIAC: Pt placed on cardiac monitor. Patient has a normal rate and regular rhythm, no edema noted, capillary refill < 3 seconds.   GASTRO: Soft and non tender to palpation, no distention noted, normoactive bowel sounds present in all four quadrants. Pt states bowel movements have been regular.  : Pt came in with a daly and I replaced it per MD verbal order..  NEURO: Pt opens eyes spontaneously, behavior appropriate to situation, follows commands, facial expression symmetrical, bilateral hand grasp equal and even, purposeful motor response noted, normal sensation in all extremities when touched with a finger.

## 2023-08-12 NOTE — PLAN OF CARE
Hospital Medicine Care Update    Spoke to patient's niece and ERNIE Robles who clarified patient was staying at [facility] and receiving hospice services through [facility]. Stated patient was incorrectly brought to the ED by NH as plan was to inform hospice agency first in the event of any decompensation. 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 Family requests no information regarding patient's malignancies be disclosed to patient. Severe dementia, causes great distress when mentioned. Discussed acute encephalopathy with family, suspect 2/2 UTI or HE, improving with rocephin and lactulose. Plan to arrange for discharge back to NH with hospice with abx and lactulose.    Daryl Paz MD  [facility]    No

## 2023-08-13 PROBLEM — K76.82 HEPATIC ENCEPHALOPATHY: Status: ACTIVE | Noted: 2023-08-13

## 2023-08-13 LAB
ALBUMIN SERPL BCP-MCNC: 2 G/DL (ref 3.5–5.2)
ALP SERPL-CCNC: 163 U/L (ref 55–135)
ALT SERPL W/O P-5'-P-CCNC: 11 U/L (ref 10–44)
ANION GAP SERPL CALC-SCNC: 13 MMOL/L (ref 8–16)
AST SERPL-CCNC: 52 U/L (ref 10–40)
BACTERIA UR CULT: ABNORMAL
BILIRUB SERPL-MCNC: 0.8 MG/DL (ref 0.1–1)
BUN SERPL-MCNC: 10 MG/DL (ref 8–23)
CALCIUM SERPL-MCNC: 8.9 MG/DL (ref 8.7–10.5)
CHLORIDE SERPL-SCNC: 104 MMOL/L (ref 95–110)
CO2 SERPL-SCNC: 27 MMOL/L (ref 23–29)
CREAT SERPL-MCNC: 0.6 MG/DL (ref 0.5–1.4)
EST. GFR  (NO RACE VARIABLE): >60 ML/MIN/1.73 M^2
GLUCOSE SERPL-MCNC: 91 MG/DL (ref 70–110)
MAGNESIUM SERPL-MCNC: 1.6 MG/DL (ref 1.6–2.6)
PHOSPHATE SERPL-MCNC: 2.5 MG/DL (ref 2.7–4.5)
POCT GLUCOSE: 89 MG/DL (ref 70–110)
POTASSIUM SERPL-SCNC: 3.1 MMOL/L (ref 3.5–5.1)
PROT SERPL-MCNC: 7 G/DL (ref 6–8.4)
SODIUM SERPL-SCNC: 144 MMOL/L (ref 136–145)

## 2023-08-13 PROCEDURE — 36415 COLL VENOUS BLD VENIPUNCTURE: CPT

## 2023-08-13 PROCEDURE — 84100 ASSAY OF PHOSPHORUS: CPT

## 2023-08-13 PROCEDURE — 80053 COMPREHEN METABOLIC PANEL: CPT

## 2023-08-13 PROCEDURE — 25000003 PHARM REV CODE 250

## 2023-08-13 PROCEDURE — 99231 SBSQ HOSP IP/OBS SF/LOW 25: CPT | Mod: GC,,, | Performed by: STUDENT IN AN ORGANIZED HEALTH CARE EDUCATION/TRAINING PROGRAM

## 2023-08-13 PROCEDURE — G0378 HOSPITAL OBSERVATION PER HR: HCPCS

## 2023-08-13 PROCEDURE — 63600175 PHARM REV CODE 636 W HCPCS: Performed by: STUDENT IN AN ORGANIZED HEALTH CARE EDUCATION/TRAINING PROGRAM

## 2023-08-13 PROCEDURE — 96372 THER/PROPH/DIAG INJ SC/IM: CPT | Performed by: STUDENT IN AN ORGANIZED HEALTH CARE EDUCATION/TRAINING PROGRAM

## 2023-08-13 PROCEDURE — 99231 PR SUBSEQUENT HOSPITAL CARE,LEVL I: ICD-10-PCS | Mod: GC,,, | Performed by: STUDENT IN AN ORGANIZED HEALTH CARE EDUCATION/TRAINING PROGRAM

## 2023-08-13 PROCEDURE — 83735 ASSAY OF MAGNESIUM: CPT

## 2023-08-13 RX ORDER — AMLODIPINE BESYLATE 10 MG/1
10 TABLET ORAL DAILY
Qty: 30 TABLET | Refills: 11
Start: 2023-08-13 | End: 2024-08-12

## 2023-08-13 RX ORDER — POTASSIUM CHLORIDE 20 MEQ/1
40 TABLET, EXTENDED RELEASE ORAL
Status: COMPLETED | OUTPATIENT
Start: 2023-08-13 | End: 2023-08-13

## 2023-08-13 RX ORDER — LISINOPRIL 40 MG/1
40 TABLET ORAL DAILY
Qty: 90 TABLET | Refills: 3
Start: 2023-08-13 | End: 2024-08-12

## 2023-08-13 RX ORDER — HALOPERIDOL 5 MG/ML
2.5 INJECTION INTRAMUSCULAR ONCE AS NEEDED
Qty: 1 ML | Refills: 0
Start: 2023-08-13 | End: 2024-08-12

## 2023-08-13 RX ORDER — LACTULOSE 10 G/15ML
20 SOLUTION ORAL 3 TIMES DAILY
Qty: 30 ML | Refills: 0
Start: 2023-08-13

## 2023-08-13 RX ORDER — LANOLIN ALCOHOL/MO/W.PET/CERES
400 CREAM (GRAM) TOPICAL ONCE
Status: COMPLETED | OUTPATIENT
Start: 2023-08-13 | End: 2023-08-13

## 2023-08-13 RX ORDER — CEPHALEXIN 250 MG/1
250 CAPSULE ORAL 4 TIMES DAILY
Qty: 12 CAPSULE | Refills: 0 | Status: SHIPPED | OUTPATIENT
Start: 2023-08-13 | End: 2023-08-16

## 2023-08-13 RX ORDER — MAGNESIUM SULFATE HEPTAHYDRATE 40 MG/ML
2 INJECTION, SOLUTION INTRAVENOUS ONCE
Status: DISCONTINUED | OUTPATIENT
Start: 2023-08-13 | End: 2023-08-13

## 2023-08-13 RX ORDER — ACETAMINOPHEN 325 MG/1
650 TABLET ORAL EVERY 4 HOURS PRN
Qty: 30 TABLET | Refills: 0
Start: 2023-08-13

## 2023-08-13 RX ADMIN — AMLODIPINE BESYLATE 10 MG: 10 TABLET ORAL at 10:08

## 2023-08-13 RX ADMIN — POTASSIUM CHLORIDE 40 MEQ: 1500 TABLET, EXTENDED RELEASE ORAL at 10:08

## 2023-08-13 RX ADMIN — ASPIRIN 81 MG 81 MG: 81 TABLET ORAL at 10:08

## 2023-08-13 RX ADMIN — LACTULOSE 20 G: 20 SOLUTION ORAL at 02:08

## 2023-08-13 RX ADMIN — POTASSIUM CHLORIDE 40 MEQ: 1500 TABLET, EXTENDED RELEASE ORAL at 01:08

## 2023-08-13 RX ADMIN — LACTULOSE 20 G: 20 SOLUTION ORAL at 10:08

## 2023-08-13 RX ADMIN — TAMSULOSIN HYDROCHLORIDE 0.4 MG: 0.4 CAPSULE ORAL at 10:08

## 2023-08-13 RX ADMIN — Medication 400 MG: at 01:08

## 2023-08-13 RX ADMIN — DIBASIC SODIUM PHOSPHATE, MONOBASIC POTASSIUM PHOSPHATE AND MONOBASIC SODIUM PHOSPHATE 2 TABLET: 852; 155; 130 TABLET ORAL at 01:08

## 2023-08-13 RX ADMIN — ATORVASTATIN CALCIUM 40 MG: 40 TABLET, FILM COATED ORAL at 10:08

## 2023-08-13 RX ADMIN — DIBASIC SODIUM PHOSPHATE, MONOBASIC POTASSIUM PHOSPHATE AND MONOBASIC SODIUM PHOSPHATE 2 TABLET: 852; 155; 130 TABLET ORAL at 10:08

## 2023-08-13 RX ADMIN — PANTOPRAZOLE SODIUM 40 MG: 40 TABLET, DELAYED RELEASE ORAL at 10:08

## 2023-08-13 RX ADMIN — POTASSIUM CHLORIDE 40 MEQ: 1500 TABLET, EXTENDED RELEASE ORAL at 02:08

## 2023-08-13 RX ADMIN — ENOXAPARIN SODIUM 40 MG: 40 INJECTION SUBCUTANEOUS at 05:08

## 2023-08-13 RX ADMIN — LISINOPRIL 40 MG: 20 TABLET ORAL at 10:08

## 2023-08-13 RX ADMIN — CARVEDILOL 25 MG: 25 TABLET, FILM COATED ORAL at 10:08

## 2023-08-13 NOTE — PLAN OF CARE
Problem: Infection  Goal: Absence of Infection Signs and Symptoms  Outcome: Ongoing, Progressing     Problem: Adult Inpatient Plan of Care  Goal: Plan of Care Review  Outcome: Ongoing, Progressing  Goal: Optimal Comfort and Wellbeing  Outcome: Ongoing, Progressing     Problem: Impaired Wound Healing  Goal: Optimal Wound Healing  Outcome: Ongoing, Progressing

## 2023-08-13 NOTE — PROGRESS NOTES
[facility] - Intensive Care ([address] Medicine  Progress Note    Patient Name: Ozzie Nobles  MRN: [id number]  Patient Class: OP- Observation   Admission Date: 2023  Length of Stay: 0 days  Attending Physician: Denver Mccoy, *  Primary Care Provider: Ivis, Primary Doctor        Subjective:     Principal Problem:Dementia with behavioral disturbance        HPI:  68-year-old male with of dementia, reported stage IV liver cancer, HLD, HTN  is presenting from [facility] after having aggressive behavior. Pt has laceration of the left foot, there is dried blood there but no injury or laceration.Patient condition has been declining from the past year after cancer diagnosis. Patient was found to be altered at his nursing home, threw a chair, and broke a window. Patients niece says this is abnormal for the patient and he normally is bed bound, slurs his words but does know his name and his  at baseline. He has an indwelling catheter that was pulled out when he was agitated, but was replaced. Patient denies fever, chills, nausea, vomiting, SOB, CP, dysuria, abdominal pain, leg swelling.     History is limited secondary to the patient's clinical condition. States he does not want to be here and want to go home with niece. Said he does not want to go back to [facility].       Afebrile, -130s, WBC 10, K 2.9, Cr 0.6, AlkP 170,   U/A >100 WBCs and RBCs, +3 leukocytes, culture pending, CTH clear  CXR - Suboptimal inspiration with mild bilateral atelectasis  Given CTX, KHCO3, 500 mL NS, haldol in the ED             Overview/Hospital Course:  Admitted to  for agitation. Resolved with treatment of UTI with rocephin, transition to keflex on DC, and initiation of lactulose for possible hepatic encephalopathy. Patient to return to NH with hospice.       Interval History: Returned to baseline with lactulose, rocephin. Plan to discharge back to NH with hospice once arranged. Dc with  lactulose, short course of keflex.     Review of Systems   Constitutional:  Negative for chills, fatigue and fever.   HENT: Negative.     Eyes:  Negative for pain and redness.   Respiratory:  Negative for apnea, choking, shortness of breath and stridor.    Cardiovascular:  Negative for chest pain and leg swelling.   Gastrointestinal:  Negative for abdominal distention, abdominal pain and blood in stool.   Endocrine: Negative.    Genitourinary:  Negative for dysuria, flank pain and genital sores.   Neurological: Negative.    Psychiatric/Behavioral:  Negative for agitation (resolved) and behavioral problems.      Objective:     Vital Signs (Most Recent):  Temp: 97.5 °F (36.4 °C) (08/13/23 1522)  Pulse: 76 (08/13/23 1522)  Resp: 16 (08/13/23 1522)  BP: 104/71 (08/13/23 1522)  SpO2: (!) 93 % (08/13/23 1522) Vital Signs (24h Range):  Temp:  [97.5 °F (36.4 °C)-98.4 °F (36.9 °C)] 97.5 °F (36.4 °C)  Pulse:  [] 76  Resp:  [16-21] 16  SpO2:  [92 %-96 %] 93 %  BP: (104-141)/(71-91) 104/71     Weight: 59.7 kg (131 lb 9.8 oz)  Body mass index is 22.59 kg/m².    Intake/Output Summary (Last 24 hours) at 8/13/2023 1555  Last data filed at 8/13/2023 0525  Gross per 24 hour   Intake 240 ml   Output 200 ml   Net 40 ml         Physical Exam  Constitutional:       General: He is not in acute distress.     Appearance: Normal appearance. He is not toxic-appearing.   HENT:      Head: Normocephalic.      Mouth/Throat:      Mouth: Mucous membranes are moist.      Pharynx: Oropharynx is clear.   Eyes:      Extraocular Movements: Extraocular movements intact.   Cardiovascular:      Rate and Rhythm: Normal rate and regular rhythm.      Pulses: Normal pulses.      Heart sounds: Normal heart sounds. No murmur heard.  Pulmonary:      Effort: No respiratory distress.      Breath sounds: Normal breath sounds. No wheezing.   Abdominal:      General: There is distension (ascites).      Tenderness: There is no abdominal tenderness.    Musculoskeletal:         General: Normal range of motion.   Skin:     General: Skin is warm and dry.      Capillary Refill: Capillary refill takes less than 2 seconds.   Neurological:      Mental Status: He is alert. Mental status is at baseline. He is disoriented.      Comments: Alert to name and year             Significant Labs: All pertinent labs within the past 24 hours have been reviewed.    Significant Imaging: I have reviewed all pertinent imaging results/findings within the past 24 hours.      Assessment/Plan:      * Dementia with behavioral disturbance  -lives at [facility]  -agitated, throwing objects, and breaking stuff  -given haldol in ED  -chronic dementia  -worsening dementia 2/2 UTI, resolved with abx.     Hepatic encephalopathy  Started lactulose 20mg qd with improvement, will discharge on 10mg qd as tolerated. Titrate to 2 BM a day. Discussed with family, felt in line with goals of care to avoid agitation and discomfort.     Urinary tract infection without hematuria  68-year-old male with of dementia, reported stage IV liver cancer, HLD, HTN  is presenting from [facility] after having aggressive behavior. Pt has laceration of the left foot, there is dried blood there but no injury or laceration.Patient condition has been declining from the past year after cancer diagnosis. Patient was found to be altered at his nursing home, threw a chair, and broke a window. Patients niece says this is abnormal for the patient and he normally is bed bound, slurs his words but does know his name and his  at baseline. He has an indwelling catheter that was pulled out when he was agitated, but was replaced. Patient denies fever, chills, nausea, vomiting, SOB, CP, dysuria, abdominal pain, leg swelling.     -Afebrile, -130s, WBC 10, K 2.9, Cr 0.6, AlkP 170,   -U/A >100 WBCs and RBCs,+3 leukocytes, culture pending   -CTX, KHCO3, 500 mL NS, haldol in the ED  -continue abx and adjust based  on sensitivities  -declining status of past yr with increasing behavorial issues  -worsening dementia and agitation 2/2 UTI    - plan to discharge on keflex.     HLD (hyperlipidemia)  -continue statin      HTN (hypertension)  -continue home lisinopril, HCTZ, Coreg, amlodipine  -stable      Hypokalemia  -K 2.9 in ED  -40 meq KHCO3 in ED  -continue to replete  -asymptomatic    8/13: repleting as patient will allow (refusing meds)      VTE Risk Mitigation (From admission, onward)         Ordered     enoxaparin injection 40 mg  Every 24 hours         08/12/23 0904     IP VTE HIGH RISK PATIENT  Once         08/12/23 0311     Place sequential compression device  Until discontinued         08/12/23 0311                Discharge Planning   MONSE: 8/13/2023     Code Status: DNR   Is the patient medically ready for discharge?: Yes    Reason for patient still in hospital (select all that apply): Pending disposition                     Daryl Paz MD  Department of Hospital Medicine   [facility] - Intensive Care ([place]-16)

## 2023-08-13 NOTE — HOSPITAL COURSE
Admitted to  for agitation. Resolved with treatment of UTI with rocephin, transition to keflex on DC, and initiation of lactulose for possible hepatic encephalopathy. Patient to return to NH with hospice. Family agreeable to lactulose and keflex.

## 2023-08-13 NOTE — PLAN OF CARE
[facility]  Department of Hospital Medicine  [address] [place] [address]  [phone] after hours  [phone] fax    HOSPICE  ORDERS    08/13/2023    Admit to Hospice:  Nursing Home with Hospice    Diagnoses:   Active Hospital Problems    Diagnosis  POA    *Dementia with behavioral disturbance [F03.918]  Unknown    Hypokalemia [E87.6]  Unknown    HTN (hypertension) [I10]  Unknown    HLD (hyperlipidemia) [E78.5]  Unknown    Urinary tract infection without hematuria [N39.0]  Unknown      Resolved Hospital Problems    Diagnosis Date Resolved POA    Acute encephalopathy [G93.40] 08/12/2023 Unknown       Hospice Qualifying Diagnoses:        Patient has a life expectancy < 6 months due to:  Primary Hospice Diagnosis: Hepatocellular carcinoma  Comorbid Conditions Contributing to Decline:  gallbladder carcinoma, diffuse metastases. Dementia. Hepatic encephalopathy. Urinary tract infections     Vital Signs: Routine per Hospice Protocol.    Code Status: DNR    Allergies: Review of patient's allergies indicates:  Not on File    Diet: adult regular    Activities: As tolerated    Goals of Care Treatment Preferences:  Code Status: DNR      Nursing: Per Hospice Routine.Routine Skin for Bedridden Patients: Apply moisture barrier cream to all skin folds and   wet areas in perineal area daily and after baths and all bowel movements.      Oxygen: PRN    Other Miscellaneous Care: n/a     Medications:        Medication List        START taking these medications      acetaminophen 325 MG tablet  Commonly known as: TYLENOL  Take 2 tablets (650 mg total) by mouth every 4 (four) hours as needed for Pain.     amLODIPine 10 MG tablet  Commonly known as: NORVASC  Take 1 tablet (10 mg total) by mouth once daily.     cephALEXin 250 MG capsule  Commonly known as: KEFLEX  Take 1 capsule (250 mg total) by mouth 4 (four) times daily for 3 days     haloperidol lactate 5 mg/mL injection  Commonly  known as: HALDOL  Inject 0.5 mLs (2.5 mg total) into the vein 1 (one) time if needed (getting out of bed without assistance).     lactulose 20 gram/30 mL Soln  Commonly known as: CHRONULAC  Take 30 mLs (20 g total) by mouth 3 (three) times daily.     lisinopriL 40 MG tablet  Commonly known as: PRINIVIL,ZESTRIL  Take 1 tablet (40 mg total) by mouth once daily.                DIABETES CARE:   Report CBG < 60 or > 350 to physician.         Insulin Sliding Scale         Glucose  Novolog Insulin Subcutaneous        0 - 60   Orange juice or glucose tablet      No insulin   201-250  2 units   251-300  4 units   301-350  6 units   351-400  8 units   >400   10 units then call physician      Future Orders:  Hospice Medical Director may dictate new orders for comfortable care measures & sign death certificate.        _________________________________  Daryl Paz MD  08/13/2023

## 2023-08-13 NOTE — ASSESSMENT & PLAN NOTE
68-year-old male with of dementia, reported stage IV liver cancer, HLD, HTN  is presenting from [facility] after having aggressive behavior. Pt has laceration of the left foot, there is dried blood there but no injury or laceration.Patient condition has been declining from the past year after cancer diagnosis. Patient was found to be altered at his nursing home, threw a chair, and broke a window. Patients niece says this is abnormal for the patient and he normally is bed bound, slurs his words but does know his name and his  at baseline. He has an indwelling catheter that was pulled out when he was agitated, but was replaced. Patient denies fever, chills, nausea, vomiting, SOB, CP, dysuria, abdominal pain, leg swelling.     -Afebrile, -130s, WBC 10, K 2.9, Cr 0.6, AlkP 170,   -U/A >100 WBCs and RBCs,+3 leukocytes, culture pending   -CTX, KHCO3, 500 mL NS, haldol in the ED  -continue abx and adjust based on sensitivities  -declining status of past yr with increasing behavorial issues  -worsening dementia and agitation 2/2 UTI    - plan to discharge on keflex.

## 2023-08-13 NOTE — ASSESSMENT & PLAN NOTE
-K 2.9 in ED  -40 meq KHCO3 in ED  -continue to replete  -asymptomatic    8/13: repleting as patient will allow (refusing meds)

## 2023-08-13 NOTE — PLAN OF CARE
NURSING HOME ORDERS    08/13/2023  [facility]  [facility] - INTENSIVE CARE ([facility]-16)  [address]  [facility] [address]  Dept: [phone]  Loc: [phone]     Admit to Nursing Home:  Hospice/Medical Facility    Diagnoses:  Active Hospital Problems    Diagnosis  POA    *Dementia with behavioral disturbance [F03.918]  Unknown    Hypokalemia [E87.6]  Unknown    HTN (hypertension) [I10]  Unknown    HLD (hyperlipidemia) [E78.5]  Unknown    Urinary tract infection without hematuria [N39.0]  Unknown      Resolved Hospital Problems    Diagnosis Date Resolved POA    Acute encephalopathy [G93.40] 08/12/2023 Unknown       Patient is homebound due to:  S4 Liver Cancer/    Allergies:Review of patient's allergies indicates:  Not on File    Vitals:  Every shift    Diet: regular diet    Activities:   Activity as tolerated    Goals of Care Treatment Preferences:  Code Status: DNR      Labs:  prn    Nursing Precautions:  Fall    Consults:   n/a    Miscellaneous Care: Routine Skin for Bedridden Patients:  Apply moisture barrier cream to all                   Diabetes Care:  Report CBG < 60 or > 350 to physician.      Medications: Discontinue all previous medication orders, if any. See new list below.     Medication List        START taking these medications      acetaminophen 325 MG tablet  Commonly known as: TYLENOL  Take 2 tablets (650 mg total) by mouth every 4 (four) hours as needed for Pain.     amLODIPine 10 MG tablet  Commonly known as: NORVASC  Take 1 tablet (10 mg total) by mouth once daily.     cephALEXin 250 MG capsule  Commonly known as: KEFLEX  Take 1 capsule (250 mg total) by mouth 4 (four) times daily. for 3 days     haloperidol lactate 5 mg/mL injection  Commonly known as: HALDOL  Inject 0.5 mLs (2.5 mg total) into the vein 1 (one) time if needed (getting out of bed without assistance).     lactulose 20 gram/30 mL Soln  Commonly known as: CHRONULAC  Take 30 mLs (20 g total) by  mouth 3 (three) times daily.     lisinopriL 40 MG tablet  Commonly known as: PRINIVIL,ZESTRIL  Take 1 tablet (40 mg total) by mouth once daily.                Immunizations Administered as of 8/13/2023       No immunizations on file.            This patient has had both covid vaccinations    Some patients may experience side effects after vaccination.  These may include fever, headache, muscle or joint aches.  Most symptoms resolve with 24-48 hours and do not require urgent medical evaluation unless they persist for more than 72 hours or symptoms are concerning for an unrelated medical condition.          _________________________________  Daryl Paz MD  08/13/2023

## 2023-08-13 NOTE — ASSESSMENT & PLAN NOTE
Started lactulose 20mg qd with improvement, will discharge on 10mg qd as tolerated. Titrate to 2 BM a day. Discussed with family, felt in line with goals of care to avoid agitation and discomfort.

## 2023-08-13 NOTE — PLAN OF CARE
JACEY contacting [facility] regarding pt's return to the nursing home. JACEY spoke with nurse, Tamara. [facility] orders are written. JACEY receiving a phone call from Jeffrey Hill [phone] with [facility] after being contacted by JACEY regarding pt's return. Jeffrey informing JACEY that pt was discharged from hospice service yesterday when pt was admitted into the hospital. Pt can return to [facility] once his niece, Katherine signs admission paperwork tomorrow, 8/14/23. Niece is caring for another family member which prevents her from signing paperwork today. Jeffrey states pt cannot return to [facility] without hospice service.     Nayan Goodman LMSW

## 2023-08-13 NOTE — SUBJECTIVE & OBJECTIVE
Interval History: Returned to baseline with lactulose, rocephin. Plan to discharge back to NH with hospice once arranged. Dc with lactulose, short course of keflex.     Review of Systems   Constitutional:  Negative for chills, fatigue and fever.   HENT: Negative.     Eyes:  Negative for pain and redness.   Respiratory:  Negative for apnea, choking, shortness of breath and stridor.    Cardiovascular:  Negative for chest pain and leg swelling.   Gastrointestinal:  Negative for abdominal distention, abdominal pain and blood in stool.   Endocrine: Negative.    Genitourinary:  Negative for dysuria, flank pain and genital sores.   Neurological: Negative.    Psychiatric/Behavioral:  Negative for agitation (resolved) and behavioral problems.      Objective:     Vital Signs (Most Recent):  Temp: 97.5 °F (36.4 °C) (08/13/23 1522)  Pulse: 76 (08/13/23 1522)  Resp: 16 (08/13/23 1522)  BP: 104/71 (08/13/23 1522)  SpO2: (!) 93 % (08/13/23 1522) Vital Signs (24h Range):  Temp:  [97.5 °F (36.4 °C)-98.4 °F (36.9 °C)] 97.5 °F (36.4 °C)  Pulse:  [] 76  Resp:  [16-21] 16  SpO2:  [92 %-96 %] 93 %  BP: (104-141)/(71-91) 104/71     Weight: 59.7 kg (131 lb 9.8 oz)  Body mass index is 22.59 kg/m².    Intake/Output Summary (Last 24 hours) at 8/13/2023 1555  Last data filed at 8/13/2023 0525  Gross per 24 hour   Intake 240 ml   Output 200 ml   Net 40 ml         Physical Exam  Constitutional:       General: He is not in acute distress.     Appearance: Normal appearance. He is not toxic-appearing.   HENT:      Head: Normocephalic.      Mouth/Throat:      Mouth: Mucous membranes are moist.      Pharynx: Oropharynx is clear.   Eyes:      Extraocular Movements: Extraocular movements intact.   Cardiovascular:      Rate and Rhythm: Normal rate and regular rhythm.      Pulses: Normal pulses.      Heart sounds: Normal heart sounds. No murmur heard.  Pulmonary:      Effort: No respiratory distress.      Breath sounds: Normal breath sounds. No  wheezing.   Abdominal:      General: There is distension (ascites).      Tenderness: There is no abdominal tenderness.   Musculoskeletal:         General: Normal range of motion.   Skin:     General: Skin is warm and dry.      Capillary Refill: Capillary refill takes less than 2 seconds.   Neurological:      Mental Status: He is alert. Mental status is at baseline. He is disoriented.      Comments: Alert to name and year             Significant Labs: All pertinent labs within the past 24 hours have been reviewed.    Significant Imaging: I have reviewed all pertinent imaging results/findings within the past 24 hours.

## 2023-08-13 NOTE — PLAN OF CARE
Problem: Infection  Goal: Absence of Infection Signs and Symptoms  Outcome: Ongoing, Progressing  Intervention: Prevent or Manage Infection  Flowsheets (Taken 8/13/2023 1037)  Infection Management: aseptic technique maintained     Problem: Adult Inpatient Plan of Care  Goal: Plan of Care Review  Outcome: Ongoing, Progressing  Flowsheets (Taken 8/13/2023 1654)  Plan of Care Reviewed With: patient  Goal: Patient-Specific Goal (Individualized)  Outcome: Ongoing, Progressing  Goal: Absence of Hospital-Acquired Illness or Injury  Outcome: Ongoing, Progressing  Goal: Optimal Comfort and Wellbeing  Outcome: Ongoing, Progressing  Goal: Readiness for Transition of Care  Outcome: Ongoing, Progressing     Problem: Skin Injury Risk Increased  Goal: Skin Health and Integrity  Outcome: Ongoing, Progressing  Intervention: Optimize Skin Protection  Flowsheets (Taken 8/13/2023 1037)  Pressure Reduction Techniques:   frequent weight shift encouraged   weight shift assistance provided  Pressure Reduction Devices:   pressure-redistributing mattress utilized   positioning supports utilized  Skin Protection: incontinence pads utilized  Head of Bed (HOB) Positioning: HOB at 30 degrees  Intervention: Promote and Optimize Oral Intake  Flowsheets (Taken 8/13/2023 1037)  Oral Nutrition Promotion: social interaction promoted     Problem: Impaired Wound Healing  Goal: Optimal Wound Healing  Outcome: Ongoing, Progressing  Intervention: Promote Wound Healing  Flowsheets (Taken 8/13/2023 1037)  Oral Nutrition Promotion: social interaction promoted  Sleep/Rest Enhancement: consistent schedule promoted  Pain Management Interventions: pain management plan reviewed with patient/caregiver     Problem: Fall Injury Risk  Goal: Absence of Fall and Fall-Related Injury  Outcome: Ongoing, Progressing  Intervention: Identify and Manage Contributors  Flowsheets (Taken 8/13/2023 1037)  Self-Care Promotion:   independence encouraged   BADL personal objects  within reach   BADL personal routines maintained   safe use of adaptive equipment encouraged   meal set-up provided

## 2023-08-13 NOTE — ASSESSMENT & PLAN NOTE
-lives at [facility]  -agitated, throwing objects, and breaking stuff  -given haldol in ED  -chronic dementia  -worsening dementia 2/2 UTI, resolved with abx.

## 2023-08-14 VITALS
OXYGEN SATURATION: 94 % | SYSTOLIC BLOOD PRESSURE: 116 MMHG | TEMPERATURE: 98 F | DIASTOLIC BLOOD PRESSURE: 88 MMHG | WEIGHT: 131.63 LBS | HEART RATE: 87 BPM | BODY MASS INDEX: 22.47 KG/M2 | HEIGHT: 64 IN | RESPIRATION RATE: 20 BRPM

## 2023-08-14 PROCEDURE — 99239 HOSP IP/OBS DSCHRG MGMT >30: CPT | Mod: GC,,, | Performed by: STUDENT IN AN ORGANIZED HEALTH CARE EDUCATION/TRAINING PROGRAM

## 2023-08-14 PROCEDURE — G0378 HOSPITAL OBSERVATION PER HR: HCPCS

## 2023-08-14 PROCEDURE — 25000003 PHARM REV CODE 250

## 2023-08-14 PROCEDURE — 99239 PR HOSPITAL DISCHARGE DAY,>30 MIN: ICD-10-PCS | Mod: GC,,, | Performed by: STUDENT IN AN ORGANIZED HEALTH CARE EDUCATION/TRAINING PROGRAM

## 2023-08-14 RX ADMIN — LISINOPRIL 40 MG: 20 TABLET ORAL at 10:08

## 2023-08-14 RX ADMIN — LACTULOSE 20 G: 20 SOLUTION ORAL at 10:08

## 2023-08-14 RX ADMIN — TAMSULOSIN HYDROCHLORIDE 0.4 MG: 0.4 CAPSULE ORAL at 10:08

## 2023-08-14 RX ADMIN — AMLODIPINE BESYLATE 10 MG: 10 TABLET ORAL at 10:08

## 2023-08-14 RX ADMIN — CARVEDILOL 25 MG: 25 TABLET, FILM COATED ORAL at 10:08

## 2023-08-14 RX ADMIN — ASPIRIN 81 MG 81 MG: 81 TABLET ORAL at 10:08

## 2023-08-14 RX ADMIN — PANTOPRAZOLE SODIUM 40 MG: 40 TABLET, DELAYED RELEASE ORAL at 10:08

## 2023-08-14 RX ADMIN — ATORVASTATIN CALCIUM 40 MG: 40 TABLET, FILM COATED ORAL at 10:08

## 2023-08-14 NOTE — DISCHARGE SUMMARY
[facility] - Intensive Care ([place] [id number])  [facility]  Discharge Summary      Patient Name: Ozzie Nobles  MRN: [id number]  TARYN: [id number]  Patient Class: OP- Observation  Admission Date: 2023  Hospital Length of Stay: 0 days  Discharge Date and Time:  2023 9:29 AM  Attending Physician: Denver Mccoy, *   Discharging Provider: Eric Peres DO  Primary Care Provider: Ivis Primary Doctor  [facility] Medicine Team: [facility] V Eric Peres DO  Primary Care Team: [facility] V    HPI:   68-year-old male with of dementia, reported stage IV liver cancer, HLD, HTN  is presenting from [facility] after having aggressive behavior. Pt has laceration of the left foot, there is dried blood there but no injury or laceration.Patient condition has been declining from the past year after cancer diagnosis. Patient was found to be altered at his nursing home, threw a chair, and broke a window. Patients niece says this is abnormal for the patient and he normally is bed bound, slurs his words but does know his name and his  at baseline. He has an indwelling catheter that was pulled out when he was agitated, but was replaced. Patient denies fever, chills, nausea, vomiting, SOB, CP, dysuria, abdominal pain, leg swelling.     History is limited secondary to the patient's clinical condition. States he does not want to be here and want to go home with niece. Said he does not want to go back to [facility].       Afebrile, -130s, WBC 10, K 2.9, Cr 0.6, AlkP 170,   U/A >100 WBCs and RBCs, +3 leukocytes, culture pending, CTH clear  CXR - Suboptimal inspiration with mild bilateral atelectasis  Given CTX, KHCO3, 500 mL NS, haldol in the ED             * No surgery found *      Hospital Course:   Admitted to  for agitation. Resolved with treatment of UTI with rocephin, transition to keflex on DC, and initiation of lactulose for possible hepatic encephalopathy. Patient to return to NH  with hospice. Family agreeable to lactulose and keflex.        Vitals:    08/13/23 2321 08/14/23 0406 08/14/23 0600 08/14/23 0855   BP: 111/71 133/83  113/73   BP Location: Left arm Left arm  Left arm   Patient Position: Lying Lying  Lying   Pulse: 96 96  98   Resp: 18 (!) 24 20 18   Temp: 97.7 °F (36.5 °C) 97.7 °F (36.5 °C)  97.9 °F (36.6 °C)   TempSrc: Oral Oral  Oral   SpO2: (!) 92% (!) 94%  95%   Weight:       Height:         Physical Exam  Constitutional:       Comments: confused but responds to questions   HENT:      Head: Normocephalic.      Nose: Nose normal.      Mouth/Throat:      Mouth: Mucous membranes are moist.   Eyes:      Pupils: Pupils are equal, round, and reactive to light.   Cardiovascular:      Rate and Rhythm: Normal rate.   Pulmonary:      Effort: Pulmonary effort is normal.      Breath sounds: No rales.   Chest:      Chest wall: No tenderness.   Abdominal:      General: There is distension.      Palpations: Abdomen is soft.   Musculoskeletal:      Cervical back: Normal range of motion.      Right lower leg: Edema present.      Left lower leg: Edema present.   Skin:     General: Skin is warm.      Capillary Refill: Capillary refill takes less than 2 seconds.   Neurological:      General: No focal deficit present.      Mental Status: He is alert. He is disoriented.   Psychiatric:         Mood and Affect: Mood normal.         Goals of Care Treatment Preferences:  Code Status: DNR      Consults:   Consults (From admission, onward)        Status Ordering Provider     Inpatient consult to Social Work  Once        Provider:  (Not yet assigned)    Acknowledged LEATHA MALAGON          Neuro  * Dementia with behavioral disturbance  -lives at [facility]  -agitated, throwing objects, and breaking stuff  -given haldol in ED  -chronic dementia  -worsening dementia 2/2 UTI, resolved with abx.     Cardiac/Vascular  HLD (hyperlipidemia)  -continue statin      HTN (hypertension)  -continue home  lisinopril, HCTZ, Coreg, amlodipine  -stable      Renal/  Urinary tract infection without hematuria  68-year-old male with of dementia, reported stage IV liver cancer, HLD, HTN  is presenting from [facility] after having aggressive behavior. Pt has laceration of the left foot, there is dried blood there but no injury or laceration.Patient condition has been declining from the past year after cancer diagnosis. Patient was found to be altered at his nursing home, threw a chair, and broke a window. Patients niece says this is abnormal for the patient and he normally is bed bound, slurs his words but does know his name and his  at baseline. He has an indwelling catheter that was pulled out when he was agitated, but was replaced. Patient denies fever, chills, nausea, vomiting, SOB, CP, dysuria, abdominal pain, leg swelling.     -Afebrile, -130s, WBC 10, K 2.9, Cr 0.6, AlkP 170,   -U/A >100 WBCs and RBCs,+3 leukocytes, culture pending   -CTX, KHCO3, 500 mL NS, haldol in the ED  -continue abx and adjust based on sensitivities  -declining status of past yr with increasing behavorial issues  -worsening dementia and agitation 2/2 UTI    - plan to discharge on keflex.     Hypokalemia  -K 2.9 in ED  -40 meq KHCO3 in ED  -continue to replete  -asymptomatic    : repleting as patient will allow (refusing meds)    GI  Hepatic encephalopathy  Started lactulose 20mg qd with improvement, will discharge on 10mg qd as tolerated. Titrate to 2 BM a day. Discussed with family, felt in line with goals of care to avoid agitation and discomfort.       Final Active Diagnoses:    Diagnosis Date Noted POA    PRINCIPAL PROBLEM:  Dementia with behavioral disturbance [F03.918] 2023 Unknown    Hepatic encephalopathy [K76.82] 2023 Yes    Hypokalemia [E87.6] 2023 Unknown    HTN (hypertension) [I10] 2023 Unknown    HLD (hyperlipidemia) [E78.5] 2023 Unknown    Urinary tract infection without  hematuria [N39.0] 08/12/2023 Unknown      Problems Resolved During this Admission:    Diagnosis Date Noted Date Resolved POA    Acute encephalopathy [G93.40] 08/12/2023 08/12/2023 Unknown       Discharged Condition: stable    Disposition: Hospice/Home    Follow Up:    Patient Instructions:   No discharge procedures on file.    Significant Diagnostic Studies: N/A    Pending Diagnostic Studies:     None         Medications:  Reconciled Home Medications:      Medication List      START taking these medications    acetaminophen 325 MG tablet  Commonly known as: TYLENOL  Take 2 tablets (650 mg total) by mouth every 4 (four) hours as needed for Pain.     amLODIPine 10 MG tablet  Commonly known as: NORVASC  Take 1 tablet (10 mg total) by mouth once daily.     cephALEXin 250 MG capsule  Commonly known as: KEFLEX  Take 1 capsule (250 mg total) by mouth 4 (four) times daily for 3 days     haloperidol lactate 5 mg/mL injection  Commonly known as: HALDOL  Inject 0.5 mLs (2.5 mg total) into the vein 1 (one) time if needed (getting out of bed without assistance).     lactulose 20 gram/30 mL Soln  Commonly known as: CHRONULAC  Take 30 mLs (20 g total) by mouth 3 (three) times daily.     lisinopriL 40 MG tablet  Commonly known as: PRINIVIL,ZESTRIL  Take 1 tablet (40 mg total) by mouth once daily.            Indwelling Lines/Drains at time of discharge:   Lines/Drains/Airways     Drain  Duration                Urethral Catheter 08/11/23 2230 Double-lumen 16 Fr. 2 days                Time spent on the discharge of patient: 45 minutes         Eric Peres DO  Department of Hospital Medicine  [facility] - Intensive Care ([place])

## 2023-08-14 NOTE — ASSESSMENT & PLAN NOTE
68-year-old male with of dementia, reported stage IV liver cancer, HLD, HTN  is presenting from [facility] after having aggressive behavior. Pt has laceration of the left foot, there is dried blood there but no injury or laceration.Patient condition has been declining from the past year after cancer diagnosis. Patient was found to be altered at his nursing home, threw a chair, and broke a window. Patients niece says this is abnormal for the patient and he normally is bed bound, slurs his words but does know his name and his  at baseline. He has an indwelling catheter that was pulled out when he was agitated, but was replaced. Patient denies fever, chills, nausea, vomiting, SOB, CP, dysuria, abdominal pain, leg swelling.     -Afebrile, -130s, WBC 10, K 2.9, Cr 0.6, AlkP 170,   -U/A >100 WBCs and RBCs,+3 leukocytes, culture pending   -CTX, KHCO3, 500 mL NS, haldol in the ED  -continue abx and adjust based on sensitivities  -declining status of past yr with increasing behavorial issues  -worsening dementia and agitation 2/2 UTI    - plan to discharge on keflex.

## 2023-08-14 NOTE — PLAN OF CARE
[facility] - Intensive Care ([place] [address])  Initial Discharge Assessment       Primary Care Provider: No, Primary Doctor    Admission Diagnosis: Chest pain [R07.9]  Urinary tract infection without hematuria, site unspecified [N39.0]  AMS (altered mental status) [R41.82]    Admission Date: 8/11/2023  Expected Discharge Date: 8/13/2023    Transition of Care Barriers: (P) None    Payor: [employer] MANAGED MEDICARE / Plan: [employer] SNP HMO PPO SPECIAL NEEDS / Product Type: Medicare Advantage /     Extended Emergency Contact Information  Primary Emergency Contact: Dilcia Robles  Mobile Phone: [phone]  Relation: Relative  Secondary Emergency Contact: Katherine Robles  Address: [address] [place] [address]  Home Phone: [phone]  Mobile Phone: [phone]  Relation: Sister  Preferred language: English   needed? No    Discharge Plan A: (P) Return to nursing home  Discharge Plan B: (P) Inpatient Hospice    No Pharmacies Listed    Initial Assessment (most recent)       Adult Discharge Assessment - 08/13/23 1910          Discharge Assessment    Assessment Type Discharge Planning Assessment (P)      Confirmed/corrected address, phone number and insurance Yes (P)      Confirmed Demographics Correct on Facesheet (P)      Source of Information health record (P)      Communicated MONSE with patient/caregiver Yes (P)      Reason For Admission Chest Pain. (P)      People in Home spouse (P)      Do you expect to return to your current living situation? No (P)      Do you have help at home or someone to help you manage your care at home? Yes (P)      Who are your caregiver(s) and their phone number(s)? Spouse (P)      Prior to hospitilization cognitive status: Unable to Assess (P)      Current cognitive status: Unable to Assess (P)      Equipment Currently Used at Home none (P)      Readmission within 30 days? No (P)      Patient currently being followed by outpatient case management?  No (P)      Do you currently have service(s) that help you manage your care at home? No (P)      Do you take prescription medications? Yes (P)      Do you have prescription coverage? Yes (P)      Coverage [employer] Medicare (P)      Do you have any problems affording any of your prescribed medications? No (P)      Is the patient taking medications as prescribed? yes (P)      Who is going to help you get home at discharge? Nursing Home/Hospice (P)      How do you get to doctors appointments? family or friend will provide (P)      Are you on dialysis? No (P)      Do you take coumadin? No (P)      Discharge Plan A Return to nursing home (P)      Discharge Plan B Inpatient Hospice (P)      DME Needed Upon Discharge  none (P)      Discharge Plan discussed with: Caregiver (P)      Name(s) and Number(s) Katherine Nitza Robles [phone] (P)      Transition of Care Barriers None (P)         Physical Activity    On average, how many days per week do you engage in moderate to strenuous exercise (like a brisk walk)? 0 days (P)      On average, how many minutes do you engage in exercise at this level? 0 min (P)         Financial Resource Strain    How hard is it for you to pay for the very basics like food, housing, medical care, and heating? Not hard at all (P)         Housing Stability    In the last 12 months, was there a time when you were not able to pay the mortgage or rent on time? No (P)      In the last 12 months, how many places have you lived? 1 (P)      In the last 12 months, was there a time when you did not have a steady place to sleep or slept in a shelter (including now)? No (P)         Transportation Needs    In the past 12 months, has lack of transportation kept you from medical appointments or from getting medications? No (P)      In the past 12 months, has lack of transportation kept you from meetings, work, or from getting things needed for daily living? No (P)         Food Insecurity    Within the past 12  months, you worried that your food would run out before you got the money to buy more. Never true (P)      Within the past 12 months, the food you bought just didn't last and you didn't have money to get more. Never true (P)         Stress    Do you feel stress - tense, restless, nervous, or anxious, or unable to sleep at night because your mind is troubled all the time - these days? Rather much (P)         Social Connections    In a typical week, how many times do you talk on the phone with family, friends, or neighbors? Once a week (P)      How often do you get together with friends or relatives? Once a week (P)      How often do you attend Buddhism or Judaism services? Never (P)      Do you belong to any clubs or organizations such as Buddhism groups, unions, fraternal or athletic groups, or school groups? No (P)      How often do you attend meetings of the clubs or organizations you belong to? Never (P)      Are you , , , , never , or living with a partner?  (P)         Alcohol Use    Q1: How often do you have a drink containing alcohol? Never (P)      Q2: How many drinks containing alcohol do you have on a typical day when you are drinking? Patient does not drink (P)      Q3: How often do you have six or more drinks on one occasion? Never (P)                       Pt. Is on hospice care with [facility] at [facility]. Planned return to NH on hospice tomorrow, 8/14/23 when Katherine betancourt signs paperwork for re-admission.     Nayan Goodman LMSW

## 2023-08-14 NOTE — ACP (ADVANCE CARE PLANNING)
Advance Care Planning   Patient DC back to hospice, he is DNR.  Please see previous documentation.      Denver Mccoy M.D.  [facility] Medicine  Pager [phone]

## 2023-08-14 NOTE — NURSING
"Pt appeared sleepy, lips and oral mucosa dry. Blood sugar checked and it was 89. Pt refuses to take meds, eat or drink anything and verbalized "C'mon now let me sleep!" and "I don't want anything!". MD informed via Secured Chat.  "

## 2023-08-14 NOTE — ASSESSMENT & PLAN NOTE
-lives at [facility]  -agitated, throwing objects, and breaking stuff  -given haldol in ED  -chronic dementia  -worsening dementia 2/2 UTI, resolved with abx.

## 2023-08-14 NOTE — PLAN OF CARE
Problem: Infection  Goal: Absence of Infection Signs and Symptoms  Outcome: Ongoing, Progressing  Intervention: Prevent or Manage Infection  Flowsheets (Taken 8/14/2023 0318)  Infection Management: aseptic technique maintained     Problem: Adult Inpatient Plan of Care  Goal: Plan of Care Review  Outcome: Ongoing, Progressing  Goal: Optimal Comfort and Wellbeing  Outcome: Ongoing, Progressing  Intervention: Monitor Pain and Promote Comfort  Flowsheets (Taken 8/14/2023 0318)  Pain Management Interventions:   care clustered   position adjusted   pillow support provided   quiet environment facilitated  Intervention: Provide Person-Centered Care  Flowsheets (Taken 8/14/2023 0318)  Trust Relationship/Rapport:   care explained   choices provided   empathic listening provided  Goal: Readiness for Transition of Care  Outcome: Ongoing, Progressing     Problem: Fall Injury Risk  Goal: Absence of Fall and Fall-Related Injury  Outcome: Ongoing, Progressing  Intervention: Identify and Manage Contributors  Flowsheets (Taken 8/14/2023 0318)  Self-Care Promotion: BADL personal objects within reach  Medication Review/Management: medications reviewed  Intervention: Promote Injury-Free Environment  Flowsheets (Taken 8/14/2023 0318)  Safety Promotion/Fall Prevention:   bed alarm set   assistive device/personal item within reach   room near unit station   /camera at bedside   instructed to call staff for mobility   upper side rails raised x 2, lower siderails raised x 1 (Peds only)

## 2023-08-14 NOTE — CONSULTS
[facility] - [facility] ([place] [address])  Wound Care    Patient Name:  Ozzie Nobles   MRN:  [id number]  Date: 8/14/2023  Diagnosis: Dementia with behavioral disturbance    History:     No past medical history on file.    Social History     Socioeconomic History    Marital status: Unknown     Social Determinants of Health     Financial Resource Strain: Low Risk  (8/13/2023)    Overall Financial Resource Strain (CARDIA)     Difficulty of Paying Living Expenses: Not hard at all   Food Insecurity: No Food Insecurity (8/13/2023)    Hunger Vital Sign     Worried About Running Out of Food in the Last Year: Never true     Ran Out of Food in the Last Year: Never true   Transportation Needs: No Transportation Needs (8/13/2023)    PRAPARE - Transportation     Lack of Transportation (Medical): No     Lack of Transportation (Non-Medical): No   Physical Activity: Inactive (8/13/2023)    Exercise Vital Sign     Days of Exercise per Week: 0 days     Minutes of Exercise per Session: 0 min   Stress: Stress Concern Present (8/13/2023)    [employer] [place] [employer] - Occupational Stress Questionnaire     Feeling of Stress : Rather much   Social Connections: Socially Isolated (8/13/2023)    Social Connection and Isolation Panel [NHANES]     Frequency of Communication with Friends and Family: Once a week     Frequency of Social Gatherings with Friends and Family: Once a week     Attends Latter-day Services: Never     Active Member of Clubs or Organizations: No     Attends Club or Organization Meetings: Never     Marital Status:    Housing Stability: Low Risk  (8/13/2023)    Housing Stability Vital Sign     Unable to Pay for Housing in the Last Year: No     Number of Places Lived in the Last Year: 1     Unstable Housing in the Last Year: No       Precautions:     Allergies as of 08/11/2023    (Not on File)       WO Assessment Details/Treatment   Patient seen for wound care consultation to buttocks.   Reviewed chart  for this encounter.   See Flow Sheet for findings.    Pt lying in bed agreeable to care. Primary LPN assisted in turning pt. Wound care cleansed with soap and water wipes. Antifungal applied to the buttocks to stop yeast formation. Supplies left at bedside. Pt denied any needs at this time.     RECOMMENDATIONS  Recommendations made to primary team for above plan via secured chat. Wound Care to follow up. Orders placed.     Discussed POC with patient and primary RN.   See EMR for orders & patient education.  Discussed POC with primary team.    Nursing to continue care.  Nursing to maintain pressure injury prevention interventions.  Contact wound care for any further questions.       08/14/23 1040   WOCN Assessment   WOCN Total Time (mins) 30   Visit Date 08/14/23   Visit Time 1040   Consult Type New   WOCN Speciality Wound   Intervention assessed;changed;applied;chart review;coordination of care;orders   Teaching on-going        Altered Skin Integrity 08/12/23 0600 medial Buttocks #1 Ulceration Partial thickness tissue loss. Shallow open ulcer with a red or pink wound bed, without slough. Intact or Open/Ruptured Serum-filled blister.   Date First Assessed/Time First Assessed: 08/12/23 0600   Altered Skin Integrity Present on Admission - Did Patient arrive to the hospital with altered skin?: yes  Orientation: medial  Location: Buttocks  Wound Number: #1  Is this injury device related...   Wound Image    Dressing Appearance No dressing   Drainage Amount None   Appearance Pink;Gray;Dry   Tissue loss description Partial thickness   Care Cleansed with:;Soap and water   Dressing Applied;Other (comment)  (Antifungal cream)   Dressing Change Due 08/14/23 08/14/2023

## 2023-08-14 NOTE — PLAN OF CARE
CM calling [facility] [phone], spoke with admissions, they state no paperwork needed for pt to return to their facility.  CM called [facility] [phone], per Amee, they will need d/c with hospice orders, CTI signed by our physician, d/c packet.      CM sent MD orders and SNF packet to [facility] and [facility] via CP.    3:05 PM  CM called [facility], spoke with admissions.  They will check CP for orders and call CM back.    3:44 PM  CM sent signed [facility] form to Amee via email.    3:51 PM  CM spoke with Amee, she states they rec'd info that they need.  CM spoke with admissions at [facility], they said for nurse to call report to [facility] [phone], Rm [id number], nurse on [phone].  Nurse notified.    Rosalie Bright, BRENDAN, BS, RN, CCM

## 2023-08-15 NOTE — PLAN OF CARE
Problem: Infection  Goal: Absence of Infection Signs and Symptoms  Outcome: Adequate for Care Transition  Intervention: Prevent or Manage Infection  Flowsheets (Taken 8/14/2023 1030)  Infection Management: aseptic technique maintained     Problem: Adult Inpatient Plan of Care  Goal: Plan of Care Review  Outcome: Adequate for Care Transition  Flowsheets (Taken 8/14/2023 1900)  Plan of Care Reviewed With: patient  Goal: Patient-Specific Goal (Individualized)  Outcome: Adequate for Care Transition  Goal: Absence of Hospital-Acquired Illness or Injury  Outcome: Adequate for Care Transition  Goal: Optimal Comfort and Wellbeing  Outcome: Adequate for Care Transition  Goal: Readiness for Transition of Care  Outcome: Adequate for Care Transition     Problem: Skin Injury Risk Increased  Goal: Skin Health and Integrity  Outcome: Adequate for Care Transition  Intervention: Promote and Optimize Oral Intake  Flowsheets (Taken 8/14/2023 1030)  Oral Nutrition Promotion: safe use of adaptive equipment encouraged     Problem: Impaired Wound Healing  Goal: Optimal Wound Healing  Outcome: Adequate for Care Transition  Intervention: Promote Wound Healing  Flowsheets (Taken 8/14/2023 1030)  Oral Nutrition Promotion: safe use of adaptive equipment encouraged  Sleep/Rest Enhancement: consistent schedule promoted     Problem: Fall Injury Risk  Goal: Absence of Fall and Fall-Related Injury  Outcome: Adequate for Care Transition  Intervention: Identify and Manage Contributors  Flowsheets (Taken 8/14/2023 1030)  Self-Care Promotion:   independence encouraged   BADL personal objects within reach   BADL personal routines maintained   safe use of adaptive equipment encouraged

## 2023-08-15 NOTE — NURSING
Emerson called to receiving nurse at [facility]. Response provided for questions asked by receiving nurse. Transportation set up per  for 1900. Transport at facility at this time to transport to [facility].

## 2023-11-13 PROBLEM — N39.0 URINARY TRACT INFECTION WITHOUT HEMATURIA: Status: RESOLVED | Noted: 2023-08-12 | Resolved: 2023-11-13
